# Patient Record
Sex: MALE | Employment: OTHER | URBAN - METROPOLITAN AREA
[De-identification: names, ages, dates, MRNs, and addresses within clinical notes are randomized per-mention and may not be internally consistent; named-entity substitution may affect disease eponyms.]

---

## 2020-09-24 ENCOUNTER — APPOINTMENT (OUTPATIENT)
Dept: RADIOLOGY | Facility: IMAGING CENTER | Age: 50
End: 2020-09-24
Attending: NURSE PRACTITIONER
Payer: COMMERCIAL

## 2020-09-24 ENCOUNTER — OFFICE VISIT (OUTPATIENT)
Dept: URGENT CARE | Facility: CLINIC | Age: 50
End: 2020-09-24
Payer: COMMERCIAL

## 2020-09-24 VITALS
HEIGHT: 69 IN | RESPIRATION RATE: 16 BRPM | SYSTOLIC BLOOD PRESSURE: 122 MMHG | HEART RATE: 66 BPM | BODY MASS INDEX: 26.07 KG/M2 | TEMPERATURE: 98 F | OXYGEN SATURATION: 96 % | DIASTOLIC BLOOD PRESSURE: 84 MMHG | WEIGHT: 176 LBS

## 2020-09-24 DIAGNOSIS — M84.475A METATARSAL FRACTURE, PATHOLOGIC, LEFT, INITIAL ENCOUNTER: ICD-10-CM

## 2020-09-24 DIAGNOSIS — S99.922A INJURY OF LEFT FOOT, INITIAL ENCOUNTER: ICD-10-CM

## 2020-09-24 PROCEDURE — 99203 OFFICE O/P NEW LOW 30 MIN: CPT | Performed by: NURSE PRACTITIONER

## 2020-09-24 PROCEDURE — 73630 X-RAY EXAM OF FOOT: CPT | Mod: TC,LT | Performed by: NURSE PRACTITIONER

## 2020-09-24 RX ORDER — FENOFIBRATE 48 MG/1
48 TABLET, COATED ORAL DAILY
COMMUNITY
End: 2021-03-16 | Stop reason: SDUPTHER

## 2020-09-24 ASSESSMENT — ENCOUNTER SYMPTOMS
DIZZINESS: 0
NAUSEA: 0
FALLS: 0
FOCAL WEAKNESS: 0
VOMITING: 0
TINGLING: 0
HEADACHES: 0
DIARRHEA: 0
CHILLS: 0
CONSTIPATION: 0
SENSORY CHANGE: 0
FEVER: 0
ABDOMINAL PAIN: 0

## 2020-09-24 NOTE — PROGRESS NOTES
Subjective:     Jw Ventura  is a 50 y.o. male who presents for Foot Injury (x last night, Lt. foot injury)       HPI   50-year-old male patient reports urgent care for new problem.  States he was walking down his outside steps holding onto the handrail and was walking down the steps sideways.  Stepped down on his left foot and sustained an eversion injury to the foot. Denies pain in the ankle.  Patient denies hearing a pop or click but has broken the same area of the left foot about 10 years ago which resulted in a spiral fracture.  Patient states he is had no pain or ongoing issues from previous injury.  Patient describes pain as a tightness and is able to ambulate but states that his pain is a 6 out of 10 with ambulation.  Has used ice, elevation and and ibuprofen for mild relief of symptoms.  Denies numbness or tingling denies weakness.  Review of Systems   Constitutional: Negative for chills, fever and malaise/fatigue.   Gastrointestinal: Negative for abdominal pain, constipation, diarrhea, nausea and vomiting.   Musculoskeletal: Positive for joint pain. Negative for falls.   Neurological: Negative for dizziness, tingling, sensory change, focal weakness and headaches.   All other systems reviewed and are negative.    History reviewed. No pertinent past medical history. History reviewed. No pertinent surgical history.   Social History     Socioeconomic History   • Marital status: Single     Spouse name: Not on file   • Number of children: Not on file   • Years of education: Not on file   • Highest education level: Not on file   Occupational History   • Not on file   Social Needs   • Financial resource strain: Not on file   • Food insecurity     Worry: Not on file     Inability: Not on file   • Transportation needs     Medical: Not on file     Non-medical: Not on file   Tobacco Use   • Smoking status: Former Smoker   • Smokeless tobacco: Never Used   Substance and Sexual Activity   • Alcohol use: Not on file    "  • Drug use: Not on file   • Sexual activity: Not on file   Lifestyle   • Physical activity     Days per week: Not on file     Minutes per session: Not on file   • Stress: Not on file   Relationships   • Social connections     Talks on phone: Not on file     Gets together: Not on file     Attends Alevism service: Not on file     Active member of club or organization: Not on file     Attends meetings of clubs or organizations: Not on file     Relationship status: Not on file   • Intimate partner violence     Fear of current or ex partner: Not on file     Emotionally abused: Not on file     Physically abused: Not on file     Forced sexual activity: Not on file   Other Topics Concern   • Not on file   Social History Narrative   • Not on file    Patient has no known allergies.     Objective:   /84 (BP Location: Left arm, Patient Position: Sitting, BP Cuff Size: Adult)   Pulse 66   Temp 36.7 °C (98 °F) (Temporal)   Resp 16   Ht 1.753 m (5' 9\")   Wt 79.8 kg (176 lb)   SpO2 96%   BMI 25.99 kg/m²   Physical Exam  Vitals signs reviewed.   Constitutional:       Appearance: Normal appearance.   Cardiovascular:      Rate and Rhythm: Normal rate and regular rhythm.      Pulses: Normal pulses.   Musculoskeletal:        Feet:    Skin:     General: Skin is warm.      Capillary Refill: Capillary refill takes less than 2 seconds.   Neurological:      Mental Status: He is alert and oriented to person, place, and time.   Psychiatric:         Mood and Affect: Mood normal.         Behavior: Behavior normal.         Thought Content: Thought content normal.         Judgment: Judgment normal.          Assessment/Plan:     1. Injury of left foot, initial encounter  - DX-FOOT-COMPLETE 3+ LEFT;   FINDINGS:  There is joint space narrowing and spurring of the first MTP joint. There is a fracture of the base of the fifth metatarsal. No dislocation is seen.        IMPRESSION:     Nondisplaced fracture of the base of the fifth " metatarsal.     Degenerative changes of the first MTP joint.  All images read and interpreted by radiology - discussed with patient     - REFERRAL TO SPORTS MEDICINE    2. Metatarsal fracture, pathologic, left, initial encounter  - REFERRAL TO SPORTS MEDICINE    Discussed physical examination findings as well as mechanism of injury and evidence of a nondisplaced fracture at the base of the fifth metatarsal of the left foot will need further evaluation by a nonsurgical sports medicine doctor.  Will provide patient with a referral.  Will also provide patient with a 4 inch Ace for support as well as a walking boot.  Discussed supportive care including rest, ice, elevation and over-the-counter NSAIDs and Tylenol per 's instructions.    Supportive care, differential diagnoses, and indications for immediate follow-up discussed with patient.    Pathogenesis of diagnosis discussed including typical length and natural progression. Patient expresses understanding and agrees to plan.    Instructed patient to return to clinic for worsening symptoms or symptoms that persist for 7 to 10 days     Please note that this dictation was created using voice recognition software. I have made every reasonable attempt to correct obvious errors, but I expect that there are errors of grammar and possibly content that I did not discover before finalizing the note.    Note electronically signed by KASSIE Glez

## 2020-10-02 ENCOUNTER — OFFICE VISIT (OUTPATIENT)
Dept: MEDICAL GROUP | Facility: CLINIC | Age: 50
End: 2020-10-02
Payer: COMMERCIAL

## 2020-10-02 VITALS
DIASTOLIC BLOOD PRESSURE: 78 MMHG | RESPIRATION RATE: 16 BRPM | HEIGHT: 69 IN | WEIGHT: 176 LBS | HEART RATE: 78 BPM | TEMPERATURE: 97.9 F | BODY MASS INDEX: 26.07 KG/M2 | OXYGEN SATURATION: 97 % | SYSTOLIC BLOOD PRESSURE: 118 MMHG

## 2020-10-02 DIAGNOSIS — S92.355A NONDISPLACED FRACTURE OF FIFTH METATARSAL BONE, LEFT FOOT, INITIAL ENCOUNTER FOR CLOSED FRACTURE: ICD-10-CM

## 2020-10-02 PROCEDURE — 28470 CLTX METATARSAL FX WO MNP EA: CPT | Mod: LT | Performed by: FAMILY MEDICINE

## 2020-10-02 ASSESSMENT — ENCOUNTER SYMPTOMS
FEVER: 0
VOMITING: 0
SHORTNESS OF BREATH: 0
DIZZINESS: 0
NAUSEA: 0
CHILLS: 0

## 2020-10-02 NOTE — PROGRESS NOTES
"Subjective:      Jw Ventura is a 50 y.o. male who presents with Foot Injury (Referral from / L foot injury )     Referred by KASSIE Glez for evaluation of LEFT foot pain    HPI   LEFT foot pain  Date of injury, September 24, 2020  Walking down outside steps at home sideways  Sustained an inversion injury to the foot   No pop at the time of injury  At the 5th metatarsal base  Worse with wt bearing  Improved with rest  POSITIVE prior injury in the same region  Not taking meds for pain at this point  No night sxs    Works as a   Likes going to gym, boxing, walking, cycling    Review of Systems   Constitutional: Negative for chills and fever.   Respiratory: Negative for shortness of breath.    Cardiovascular: Negative for chest pain.   Gastrointestinal: Negative for nausea and vomiting.   Neurological: Negative for dizziness.     PMH:  has a past medical history of Hyperlipemia.  MEDS:   Current Outpatient Medications:   •  fenofibrate (TRICOR) 48 MG Tab, Take 48 mg by mouth every day., Disp: , Rfl:   ALLERGIES: No Known Allergies  SURGHX:   Past Surgical History:   Procedure Laterality Date   • HIP ORIF      acetabular fx     SOCHX:  reports that he has quit smoking. He has never used smokeless tobacco.  FH: Family history was reviewed, no pertinent findings to report     Objective:     /78 (BP Location: Right arm, Patient Position: Sitting, BP Cuff Size: Adult)   Pulse 78   Temp 36.6 °C (97.9 °F) (Temporal)   Resp 16   Ht 1.753 m (5' 9\")   Wt 79.8 kg (176 lb)   SpO2 97%   BMI 25.99 kg/m²      Physical Exam     RIGHT ANKLE:  There is NO swelling noted at the ankle  Range of motion intact with dorsiflexion and plantarflexion, inversion and eversion  There is NO tenderness of the ATFL, CF or PTF ligament  There is NO tenderness of the lateral malleolus or medial malleolus  Anterior drawer testing is NEGATIVE  Talar tilt testing is NEGATIVE  The foot and ankle is otherwise " "neurovascularly intact    RIGHT FOOT:  There is NO swelling noted at the foot  There is NO tenderness at the base of the fifth metatarsal, cuboid, or tarsal navicular  There is NO pain with metatarsal squeeze test    LEFT ANKLE:  There is NO swelling noted at the ankle  Range of motion intact with dorsiflexion and plantarflexion, inversion and eversion  There is NO tenderness of the ATFL, CF or PTF ligament  There is NO tenderness of the lateral malleolus or medial malleolus  Anterior drawer testing is NEGATIVE  Talar tilt testing is NEGATIVE  The foot and ankle is otherwise neurovascularly intact    LEFT FOOT:  There is POSITIVE swelling noted at the foot with some ecchymosis  There is POSITIVE tenderness at the base of the fifth metatarsal, without tenderness of the cuboid, or tarsal navicular  There is MILD pain with metatarsal squeeze test    NEUTRAL stance  Able to ambulate with ANTALGIC gait     Assessment/Plan:        1. Nondisplaced fracture of fifth metatarsal bone, left foot, initial encounter for closed fracture       Date of injury, September 24, 2020  Walking down outside steps at home sideways  Sustained an inversion injury to the foot     He was properly fitted with a cam walker boot at his urgent care visit  He wore it for a few days and has since not been wearing his boot  We discussed the importance of compliance with wearing his cam walker boot since he has a higher risk of nonunion/further injury due to:  1.  Prior history of LEFT hip fracture with residual \"nerve damage\" resulting in numbness of the LEFT lower extremity  2.  Location of the fracture is in zone 2    Patient was reluctant to wear his boot because he has difficulty putting it on and off secondary to his LEFT hip stiffness from prior fracture  We discussed the risks associated with noncompliance including breaking through resulting in the need for surgical reduction    We also offered patient crutches study which not have to " weight-bear or can partially weight-bear in order to increase his likelihood of fracture union/healing  Patient also declined crutches    Return in about 2 weeks (around 10/16/2020).  For fracture check          9/24/2020 11:13 AM     HISTORY/REASON FOR EXAM:  Pain/Deformity Following Trauma        TECHNIQUE/EXAM DESCRIPTION AND NUMBER OF VIEWS:  3 nonweightbearing views of the LEFT foot.     COMPARISON:  None     FINDINGS:  There is joint space narrowing and spurring of the first MTP joint. There is a fracture of the base of the fifth metatarsal. No dislocation is seen.        IMPRESSION:     Nondisplaced fracture of the base of the fifth metatarsal.     Degenerative changes of the first MTP joint.    Interpreted in the office today with the patient    Thank you KASSIE Glez for allowing me to participate in caring for your patient.

## 2020-10-16 ENCOUNTER — APPOINTMENT (OUTPATIENT)
Dept: RADIOLOGY | Facility: IMAGING CENTER | Age: 50
End: 2020-10-16
Attending: FAMILY MEDICINE
Payer: COMMERCIAL

## 2020-10-16 ENCOUNTER — OFFICE VISIT (OUTPATIENT)
Dept: MEDICAL GROUP | Facility: CLINIC | Age: 50
End: 2020-10-16
Payer: COMMERCIAL

## 2020-10-16 VITALS
HEART RATE: 82 BPM | TEMPERATURE: 98.5 F | BODY MASS INDEX: 26.07 KG/M2 | WEIGHT: 176 LBS | DIASTOLIC BLOOD PRESSURE: 78 MMHG | SYSTOLIC BLOOD PRESSURE: 122 MMHG | OXYGEN SATURATION: 96 % | RESPIRATION RATE: 18 BRPM | HEIGHT: 69 IN

## 2020-10-16 DIAGNOSIS — S92.355A NONDISPLACED FRACTURE OF FIFTH METATARSAL BONE, LEFT FOOT, INITIAL ENCOUNTER FOR CLOSED FRACTURE: ICD-10-CM

## 2020-10-16 PROCEDURE — 99024 POSTOP FOLLOW-UP VISIT: CPT | Performed by: FAMILY MEDICINE

## 2020-10-16 PROCEDURE — 73630 X-RAY EXAM OF FOOT: CPT | Mod: TC,LT | Performed by: FAMILY MEDICINE

## 2020-10-16 NOTE — PROGRESS NOTES
"Subjective:      Jw Ventura is a 50 y.o. male who presents with Foot Injury (Referral from / L foot injury )     Referred by KASSIE Glez for evaluation of LEFT foot pain    HPI   LEFT foot pain  Date of injury, September 24, 2020  Walking down outside steps at home sideways  Sustained an inversion injury to the foot   No pop at the time of injury  At the 5th metatarsal base  NO pain in boot    Works as a   Likes going to gym, boxing, walking, cycling     Objective:     /78 (BP Location: Left arm, Patient Position: Sitting, BP Cuff Size: Adult)   Pulse 82   Temp 36.9 °C (98.5 °F) (Temporal)   Resp 18   Ht 1.753 m (5' 9\")   Wt 79.8 kg (176 lb)   SpO2 96%   BMI 25.99 kg/m²       Physical Exam    LEFT FOOT:  There is POSITIVE swelling noted at the foot with some ecchymosis  There is MINIMAL tenderness at the base of the fifth metatarsal, without tenderness of the cuboid, or tarsal navicular    NEUTRAL stance  Able to ambulate with NORMAL gait in cam walker     Assessment/Plan:        1. Nondisplaced fracture of fifth metatarsal bone, left foot, initial encounter for closed fracture       Date of injury, September 24, 2020  Walking down outside steps at home sideways  Sustained an inversion injury to the foot     1.  Prior history of LEFT hip fracture with residual \"nerve damage\" resulting in numbness of the LEFT lower extremity  2.  Location of the fracture is in zone 2    Continue cam walker    Discontinue boot on November 6, 2020 (he will have been immobilized for 6 weeks at that point)    Then, he can follow-up the week after, on or around November 13, 2020  Hopefully, we can clear him for regular activity at that point      10/16/2020 11:37 AM     HISTORY/REASON FOR EXAM:  Pain/Deformity Following Trauma; Verify fracture stability  Follow-up left base of 5th metatarsal fracture     TECHNIQUE/EXAM DESCRIPTION AND NUMBER OF VIEWS:  3 views of the LEFT foot.     COMPARISON:  " Left foot x-ray 9/24/2020     FINDINGS:     There is a fracture of the base of 5th metatarsal. Alignment is unchanged. No bony healing is identified.     There is probably an old 5th metatarsal diaphyseal fracture.     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     IMPRESSION:     No significant change in base of 5th metatarsal fracture                  9/24/2020 11:13 AM     HISTORY/REASON FOR EXAM:  Pain/Deformity Following Trauma        TECHNIQUE/EXAM DESCRIPTION AND NUMBER OF VIEWS:  3 nonweightbearing views of the LEFT foot.     COMPARISON:  None     FINDINGS:  There is joint space narrowing and spurring of the first MTP joint. There is a fracture of the base of the fifth metatarsal. No dislocation is seen.        IMPRESSION:     Nondisplaced fracture of the base of the fifth metatarsal.     Degenerative changes of the first MTP joint.    Thank you KASSIE Glez for allowing me to participate in caring for your patient.

## 2020-11-12 ENCOUNTER — OFFICE VISIT (OUTPATIENT)
Dept: MEDICAL GROUP | Facility: CLINIC | Age: 50
End: 2020-11-12
Payer: COMMERCIAL

## 2020-11-12 VITALS
TEMPERATURE: 98.2 F | SYSTOLIC BLOOD PRESSURE: 118 MMHG | RESPIRATION RATE: 16 BRPM | OXYGEN SATURATION: 97 % | HEIGHT: 69 IN | BODY MASS INDEX: 26.07 KG/M2 | DIASTOLIC BLOOD PRESSURE: 76 MMHG | WEIGHT: 176 LBS | HEART RATE: 78 BPM

## 2020-11-12 DIAGNOSIS — S92.355D NONDISPLACED FRACTURE OF FIFTH METATARSAL BONE, LEFT FOOT, SUBSEQUENT ENCOUNTER FOR FRACTURE WITH ROUTINE HEALING: ICD-10-CM

## 2020-11-12 PROCEDURE — 99024 POSTOP FOLLOW-UP VISIT: CPT | Performed by: FAMILY MEDICINE

## 2020-11-12 NOTE — PROGRESS NOTES
"Subjective:      Jw Ventura is a 50 y.o. male who presents with Foot Injury (Referral from [facility]/ L foot injury )     Referred by KASSIE Glez for evaluation of LEFT foot pain    HPI   LEFT foot pain  Date of injury, September 24, 2020  Walking down outside steps at home sideways  Sustained an inversion injury to the foot   No pop at the time of injury  At the 5th metatarsal base  NO pain in boot    Works as a   Likes going to gym, boxing, walking, cycling     Objective:     /76 (BP Location: Left arm, Patient Position: Sitting, BP Cuff Size: Adult)   Pulse 78   Temp 36.8 °C (98.2 °F) (Temporal)   Resp 16   Ht 1.753 m (5' 9\")   Wt 79.8 kg (176 lb)   SpO2 97%   BMI 25.99 kg/m²     Physical Exam    LEFT FOOT:  There is POSITIVE swelling noted at the foot with some ecchymosis  There is MINIMAL tenderness at the base of the fifth metatarsal, without tenderness of the cuboid, or tarsal navicular    NEUTRAL stance  Able to ambulate with NORMAL gait in cam walker     Assessment/Plan:       1. Nondisplaced fracture of fifth metatarsal bone, left foot, subsequent encounter for fracture with routine healing       Date of injury, September 24, 2020  Walking down outside steps at home sideways  Sustained an inversion injury to the foot     1.  Prior history of LEFT hip fracture with residual \"nerve damage\" resulting in numbness of the LEFT lower extremity  2.  Location of the fracture is in zone 2    He is now out of CAM Walker and tolerating full weightbearing without pain    Return to activity as tolerated, offered ankle brace, but patient politely declined due to his difficulty with bending to put on brace due to his prior hip issue    Follow-up as needed  He was provided with home exercise program for foot/ankle strengthening and we encouraged proprioceptive training    10/16/2020 11:37 AM     HISTORY/REASON FOR EXAM:  Pain/Deformity Following Trauma; Verify fracture " stability  Follow-up left base of 5th metatarsal fracture     TECHNIQUE/EXAM DESCRIPTION AND NUMBER OF VIEWS:  3 views of the LEFT foot.     COMPARISON:  Left foot x-ray 9/24/2020     FINDINGS:     There is a fracture of the base of 5th metatarsal. Alignment is unchanged. No bony healing is identified.     There is probably an old 5th metatarsal diaphyseal fracture.     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     IMPRESSION:     No significant change in base of 5th metatarsal fracture                  9/24/2020 11:13 AM     HISTORY/REASON FOR EXAM:  Pain/Deformity Following Trauma        TECHNIQUE/EXAM DESCRIPTION AND NUMBER OF VIEWS:  3 nonweightbearing views of the LEFT foot.     COMPARISON:  None     FINDINGS:  There is joint space narrowing and spurring of the first MTP joint. There is a fracture of the base of the fifth metatarsal. No dislocation is seen.        IMPRESSION:     Nondisplaced fracture of the base of the fifth metatarsal.     Degenerative changes of the first MTP joint.    Thank you KASSIE Glez for allowing me to participate in caring for your patient.

## 2021-01-19 ENCOUNTER — TELEPHONE (OUTPATIENT)
Dept: SCHEDULING | Facility: IMAGING CENTER | Age: 51
End: 2021-01-19

## 2021-03-16 ENCOUNTER — OFFICE VISIT (OUTPATIENT)
Dept: MEDICAL GROUP | Facility: MEDICAL CENTER | Age: 51
End: 2021-03-16
Payer: COMMERCIAL

## 2021-03-16 VITALS
WEIGHT: 184 LBS | TEMPERATURE: 97.6 F | HEIGHT: 69 IN | SYSTOLIC BLOOD PRESSURE: 124 MMHG | OXYGEN SATURATION: 96 % | RESPIRATION RATE: 16 BRPM | BODY MASS INDEX: 27.25 KG/M2 | DIASTOLIC BLOOD PRESSURE: 78 MMHG | HEART RATE: 74 BPM

## 2021-03-16 DIAGNOSIS — M25.511 ACUTE PAIN OF RIGHT SHOULDER: ICD-10-CM

## 2021-03-16 DIAGNOSIS — E78.1 HYPERTRIGLYCERIDEMIA: ICD-10-CM

## 2021-03-16 DIAGNOSIS — Z97.8 PRESENCE OF ORTHOTIC DEVICE: ICD-10-CM

## 2021-03-16 DIAGNOSIS — E78.5 HYPERLIPIDEMIA, UNSPECIFIED HYPERLIPIDEMIA TYPE: ICD-10-CM

## 2021-03-16 DIAGNOSIS — L98.9 BACK SKIN LESION: ICD-10-CM

## 2021-03-16 PROCEDURE — 99213 OFFICE O/P EST LOW 20 MIN: CPT | Performed by: FAMILY MEDICINE

## 2021-03-16 RX ORDER — FENOFIBRATE 48 MG/1
48 TABLET, COATED ORAL DAILY
Qty: 90 TABLET | Refills: 3 | Status: SHIPPED | OUTPATIENT
Start: 2021-03-16 | End: 2021-03-31

## 2021-03-16 ASSESSMENT — PATIENT HEALTH QUESTIONNAIRE - PHQ9: CLINICAL INTERPRETATION OF PHQ2 SCORE: 0

## 2021-03-16 NOTE — PROGRESS NOTES
"  Subjective:     Jw Ivan is a 50 y.o. male presenting to establish care:    1.  Shoulder pain: Has been having right shoulder pain for the past month and a half.  Describes a sharp pain that is occasionally dull, is intermittent, does not radiate.  No associated symptoms.  Denies any redness, swelling, numbness, tingling, weakness.  He will usually feel the pain with certain motions.  He has been trying to do more push-ups, is not sure if it is helping.  Also taking ibuprofen intermittently.  No recent injuries.  He does report extensive skateboarding, has had taken multiple falls in the past.  He did have a left hip fracture back in 2012.    2.  Orthotics: Has orthotics in his feet bilaterally, needs a referral to podiatry for new orthotics    3.  Skin lesion: He reports multiple skin lesions on his back that he usually sees a dermatologist for.  Needs referral.    4.  High cholesterol: Has a history of high cholesterol, is on fenofibrate.  He believes that his triglyceride levels were elevated in the past.      Current Outpatient Medications:   •  fenofibrate (TRICOR) 48 MG Tab, Take 1 tablet by mouth every day., Disp: 90 tablet, Rfl: 3    Objective:     Vitals: /78   Pulse 74   Temp 36.4 °C (97.6 °F)   Resp 16   Ht 1.753 m (5' 9\")   Wt 83.5 kg (184 lb)   SpO2 96%   BMI 27.17 kg/m²   General: Alert  HEENT: Normocephalic.  Neck supple.  No thyromegaly or masses palpated. No cervical or supraclavicular lymphadenopathy.  Heart: Regular rate and rhythm.  S1 and S2 normal.  No murmurs appreciated.  Respiratory: Normal respiratory effort.  Clear to auscultation bilaterally.  Abdomen: Non-distended, soft  Extremities: No leg edema.  MSK: No edema, ecchymosis, or deformities in the upper extremities.  No tenderness to palpation, but points to pain near the AC joint, anterior aspect of the shoulder. Range of motion of the shoulder intact.  Strength with biceps, triceps, deltoid,  5/5 bilaterally.  " Tone normal, no muscle atrophy.  Sensation grossly intact.  Biceps reflexes 2+ symmetric.  Radial pulses 2+ symmetric.  Negative [place], [place]'s, [place]'s, empty can.     Assessment/Plan:     Jw was seen today for establish care.    Diagnoses and all orders for this visit:    Acute pain of right shoulder  Self-limited issue, we discussed trying physical therapy first.  If no improvement, he will let us know.  Will refer to sports medicine next.  -     REFERRAL TO PHYSICAL THERAPY    Presence of orthotic device  -     REFERRAL TO PODIATRY    Back skin lesion  -     REFERRAL TO DERMATOLOGY    Hyperlipidemia, unspecified hyperlipidemia type  Hypertriglyceridemia  Chronic, stable, will check labs.  Continue fenofibrate.  Follow-up in a year  -     Comp Metabolic Panel; Future  -     Lipid Profile; Future  -     fenofibrate (TRICOR) 48 MG Tab; Take 1 tablet by mouth every day.        Return in about 1 year (around 3/16/2022) for routine follow up.

## 2021-03-30 ENCOUNTER — HOSPITAL ENCOUNTER (OUTPATIENT)
Dept: LAB | Facility: MEDICAL CENTER | Age: 51
End: 2021-03-30
Attending: FAMILY MEDICINE
Payer: COMMERCIAL

## 2021-03-30 DIAGNOSIS — E78.5 HYPERLIPIDEMIA, UNSPECIFIED HYPERLIPIDEMIA TYPE: ICD-10-CM

## 2021-03-30 LAB
ALBUMIN SERPL BCP-MCNC: 4.5 G/DL (ref 3.2–4.9)
ALBUMIN/GLOB SERPL: 1.7 G/DL
ALP SERPL-CCNC: 57 U/L (ref 30–99)
ALT SERPL-CCNC: 32 U/L (ref 2–50)
ANION GAP SERPL CALC-SCNC: 7 MMOL/L (ref 7–16)
AST SERPL-CCNC: 25 U/L (ref 12–45)
BILIRUB SERPL-MCNC: 0.5 MG/DL (ref 0.1–1.5)
BUN SERPL-MCNC: 13 MG/DL (ref 8–22)
CALCIUM SERPL-MCNC: 9.2 MG/DL (ref 8.5–10.5)
CHLORIDE SERPL-SCNC: 109 MMOL/L (ref 96–112)
CHOLEST SERPL-MCNC: 176 MG/DL (ref 100–199)
CO2 SERPL-SCNC: 28 MMOL/L (ref 20–33)
CREAT SERPL-MCNC: 0.88 MG/DL (ref 0.5–1.4)
GLOBULIN SER CALC-MCNC: 2.7 G/DL (ref 1.9–3.5)
GLUCOSE SERPL-MCNC: 96 MG/DL (ref 65–99)
HDLC SERPL-MCNC: 37 MG/DL
LDLC SERPL CALC-MCNC: 98 MG/DL
POTASSIUM SERPL-SCNC: 4.9 MMOL/L (ref 3.6–5.5)
PROT SERPL-MCNC: 7.2 G/DL (ref 6–8.2)
SODIUM SERPL-SCNC: 144 MMOL/L (ref 135–145)
TRIGL SERPL-MCNC: 205 MG/DL (ref 0–149)

## 2021-03-30 PROCEDURE — 36415 COLL VENOUS BLD VENIPUNCTURE: CPT

## 2021-03-30 PROCEDURE — 80061 LIPID PANEL: CPT

## 2021-03-30 PROCEDURE — 80053 COMPREHEN METABOLIC PANEL: CPT

## 2021-03-31 ENCOUNTER — IMMUNIZATION (OUTPATIENT)
Dept: FAMILY PLANNING/WOMEN'S HEALTH CLINIC | Facility: IMMUNIZATION CENTER | Age: 51
End: 2021-03-31
Payer: COMMERCIAL

## 2021-03-31 DIAGNOSIS — Z23 ENCOUNTER FOR VACCINATION: Primary | ICD-10-CM

## 2021-03-31 PROCEDURE — 91300 PFIZER SARS-COV-2 VACCINE: CPT | Performed by: INTERNAL MEDICINE

## 2021-03-31 PROCEDURE — 0001A PFIZER SARS-COV-2 VACCINE: CPT | Performed by: INTERNAL MEDICINE

## 2021-03-31 RX ORDER — FENOFIBRATE 145 MG/1
145 TABLET, COATED ORAL DAILY
Qty: 90 TABLET | Refills: 3 | Status: SHIPPED | OUTPATIENT
Start: 2021-03-31 | End: 2021-12-20 | Stop reason: SDUPTHER

## 2021-04-22 ENCOUNTER — PHYSICAL THERAPY (OUTPATIENT)
Dept: PHYSICAL THERAPY | Facility: REHABILITATION | Age: 51
End: 2021-04-22
Attending: FAMILY MEDICINE
Payer: COMMERCIAL

## 2021-04-22 DIAGNOSIS — M25.511 ACUTE PAIN OF RIGHT SHOULDER: ICD-10-CM

## 2021-04-22 PROCEDURE — 97161 PT EVAL LOW COMPLEX 20 MIN: CPT

## 2021-04-22 PROCEDURE — 97110 THERAPEUTIC EXERCISES: CPT

## 2021-04-22 SDOH — ECONOMIC STABILITY: GENERAL: QUALITY OF LIFE: GOOD

## 2021-04-22 ASSESSMENT — ENCOUNTER SYMPTOMS
EXACERBATED BY: SLEEPING
QUALITY: SHARP
PAIN TIMING: DURING SLEEP
PAIN SCALE: 0
ALLEVIATING FACTORS: POSITION CHANGE
PAIN TIMING: INTERMITTENT
EXACERBATED BY: OVERHEAD ACTIVITY
PAIN LOCATION: R SHOULDER
ALLEVIATING FACTORS: PAIN MEDICATION
PAIN SCALE AT LOWEST: 0
PAIN SCALE AT HIGHEST: 8
EXACERBATED BY: ACTIVITY
QUALITY: THROBBING

## 2021-04-22 NOTE — OP THERAPY EVALUATION
"  Outpatient Physical Therapy  INITIAL EVALUATION    [facility] [address].  [address]  [place] [address]  Phone:  [phone]  Fax:  [phone]    Date of Evaluation: 04/22/2021    Patient: Jw Ivan  YOB: 1970  MRN: [id number]     Referring Provider: Travis Rivera M.D.  [address] [facility]  [address]  [place] [address]   Referring Diagnosis Acute pain of right shoulder [M25.511]     Time Calculation    Start time: 1530  Stop time: 1620 Time Calculation (min): 50 minutes         Chief Complaint: Shoulder Injury and Shoulder Problem    Visit Diagnoses     ICD-10-CM   1. Acute pain of right shoulder  M25.511       No data found  Subjective:   History of Present Illness:     Date of onset:  2/1/2021    Mechanism of injury:  Per pt's visit with his PCP on 3/12/21, \"Shoulder pain: Has been having right shoulder pain for the past month and a half.  Describes a sharp pain that is occasionally dull, is intermittent, does not radiate.  No associated symptoms.  Denies any redness, swelling, numbness, tingling, weakness.  He will usually feel the pain with certain motions.  He has been trying to do more push-ups, is not sure if it is helping.  Also taking ibuprofen intermittently.  No recent injuries.  He does report extensive skateboarding, has had taken multiple falls in the past.  He did have a left hip fracture back in 2012.\"    The pt states that his shoulder has been bothering him since early February. He states that there wasn't a specific RADHA but that he could have irritated it sleeping. The pain is most prominent at night and occasionally even wakes him from sleep. Pt able to sleep in most positions but certain positions aggravate the pain. Pt previously went to the gym but has not with COVID. Pain with push ups, some pain reaching overhead, experiences crepitus in the R shoulder, pain with lateral raises, difficulty reaching behind back, states that he is " ergonomically conscious at his desk so no difficulty with working.     Quality of life:  Good  Prior level of function:  Pt previously independent with all functional tasks and IADLs requiring the R UE  Headaches:  no headaches  Pain:     Current pain ratin    At best pain ratin    At worst pain ratin    Location:  R shoulder    Quality:  Throbbing and sharp    Pain timing:  During sleep and intermittent    Relieving factors:  Pain medication and position change    Aggravating factors:  Activity, sleeping and overhead activity    Progression:  Stable  Social Support:     Lives in:  Multiple-level home    Lives with:  Spouse  Hand dominance:  Right  Treatments:     None    Patient Goals:     Patient goals for therapy:  Increased strength, decreased pain and independence with ADLs/IADLs    Other patient goals:  Avoid future intervention      Past Medical History:   Diagnosis Date   • Hyperlipemia      Past Surgical History:   Procedure Laterality Date   • OTHER ORTHOPEDIC SURGERY Left     hip surgery   • HIP ORIF      acetabular fx     Social History     Tobacco Use   • Smoking status: Former Smoker   • Smokeless tobacco: Never Used   Substance Use Topics   • Alcohol use: Not Currently     Comment: rarely     Family and Occupational History     Socioeconomic History   • Marital status:      Spouse name: Not on file   • Number of children: Not on file   • Years of education: Not on file   • Highest education level: Not on file   Occupational History   • Not on file       Objective     Postural Observations  Seated posture: good  Standing posture: good        Cervical Screen    Cervical range of motion within normal limits    Neurological Testing     Sensation     Shoulder   Left Shoulder   Intact: light touch    Right Shoulder   Intact: light touch    Myotome testing   Cervical (left)   All left cervical myotomes within normal limits    Cervical (right)   All right cervical myotomes within  normal limits    Palpation     Right   No palpable tenderness to the infraspinatus.   Hypertonic in the levator scapulae and upper trapezius.     Tenderness     Right Shoulder  Tenderness in the AC joint and biceps tendon (proximal). No tenderness in the infraspinatus tendon.     Active Range of Motion   Left Shoulder   Normal active range of motion    Right Shoulder   Flexion: 150 degrees   Abduction: 150 degrees   External rotation BTH: C6 (Stiff)   Internal rotation BTB: L3 (Pain)     Passive Range of Motion   Left Shoulder   Normal passive range of motion    Right Shoulder   Normal passive range of motion    Additional Passive Range of Motion Details  Mild pain at end range shoulder flexion PROM    Strength:      Left Shoulder   Normal muscle strength    Right Shoulder   Planes of Motion   Flexion: 5   Abduction: 4 (Pain)   External rotation at 0°: 4+   Internal rotation at 0°: 4+   Isolated Muscles   Biceps: 4+     Tests     Right Shoulder   Positive empty can, Neer's and painful arc.   Negative belly press, drop arm and Hawkin's.         Therapeutic Exercises (CPT 94049):     1. UBE Warm Up, L2, 3 minutes      Therapeutic Exercise Summary: Pt completed exercises below with therapist supervision to ensure proper performance. Pt was printed a copy of exercise program to perform at home.     Access Code: [email or web address]  URL: [email or web address]  Date: 04/22/2021  Prepared by: Hannah Shankar    Exercises  Shoulder External Rotation with Anchored Resistance - 1 x daily - 7 x weekly - 10 reps - 3 sets  Shoulder Internal Rotation with Resistance - 1 x daily - 7 x weekly - 10 reps - 3 sets  Standing Single Arm Eccentric Bicep Curl Pronated then Supinated - 1 x daily - 7 x weekly - 10 reps - 3 sets 6# weight  Standing Shoulder and Trunk Flexion at Table - 1 x daily - 7 x weekly - 10 reps - 3 sets  Sidelying Shoulder ER with Towel and Dumbbell - 1 x daily - 7 x weekly - 10 reps - 3 sets        Time-based  treatments/modalities:    Physical Therapy Timed Treatment Charges  Therapeutic exercise minutes (CPT 43625): 11 minutes      Assessment, Response and Plan:   Impairments: abnormal or restricted ROM, activity intolerance, lacks appropriate home exercise program, limited mobility and pain with function    Assessment details:  Jw Ivan is a 50 y.o. male who presents to skilled physical therapist initial evaluation with current complaints of insidious onset R shoulder pain that began in February 2021. He presents with objective impairments in R shoulder AROM, R shoulder strength and stability, positive impingement special tests, and decreased tolerance to desired functional tasks including sleeping and working out. The impairments found during PT evaluation can be addressed by PT intervention and he appears motivated to participate in therapeutic intervention in order to improve his current symptoms. It is anticipated that the pt will benefit from skilled PT intervention to address functional limitations and impairments detailed above and to maximize his return to PLOF.   Barriers to therapy:  None  Prognosis: good    Goals:   Short Term Goals:   1. Pt will demonstrate R shoulder flexion AROM to 160 degrees without pain  2. Pt will demonstrate R shoulder ER MMT to 5/5  3. Pt will be able to sleep for 4 hours without being awoken by pain  4. Pt will be able to perform 10 push ups with proper form and no increase in shoulder symptoms  Short term goal time span:  2-4 weeks      Long Term Goals:    1. Pt will be able to sleep through the night without an increase in shoulder symptoms  2. Pt will report at worst R shoulder pain to <3/10  3. Pt will be able to lift 10# overhead without an increase in pain  4. Pt will be independent with HEP  Long term goal time span:  6-8 weeks    Plan:   Therapy options:  Physical therapy treatment to continue  Planned therapy interventions:  E Stim Unattended (CPT 38938), Hot or Cold  Pack Therapy (CPT 24025), Manual Therapy (CPT 84037), Neuromuscular Re-education (CPT 89921) and Therapeutic Exercise (CPT 01239)  Frequency:  2x week  Duration in weeks:  6  Duration in visits:  12  Discussed with:  Patient  Plan details:  Pt educated on their current objective clinical presentation, anticipated PT POC, and importance of adherence to prescribed HEP in order to maximize PT benefit.        Functional Assessment Used  Quickdash General Total Score: 11.36     Referring provider co-signature:  I have reviewed this plan of care and my co-signature certifies the need for services.    Certification Period: 04/22/2021 to  06/10/21    Physician Signature: ________________________________ Date: ______________

## 2021-04-27 ENCOUNTER — PHYSICAL THERAPY (OUTPATIENT)
Dept: PHYSICAL THERAPY | Facility: REHABILITATION | Age: 51
End: 2021-04-27
Attending: FAMILY MEDICINE
Payer: COMMERCIAL

## 2021-04-27 DIAGNOSIS — M25.511 ACUTE PAIN OF RIGHT SHOULDER: ICD-10-CM

## 2021-04-27 PROCEDURE — 97110 THERAPEUTIC EXERCISES: CPT

## 2021-04-27 NOTE — OP THERAPY DAILY TREATMENT
"  Outpatient Physical Therapy  DAILY TREATMENT     [facility] Physical [address].  [address]  [place] [address]  Phone:  [phone]  Fax:  [phone]    Date: 04/27/2021    Patient: Jw Ivan  YOB: 1970  MRN: [id number]     Time Calculation    Start time: 1530  Stop time: 1600 Time Calculation (min): 30 minutes         Chief Complaint: Shoulder Problem    Visit #: 2    SUBJECTIVE:  The pt reports that he noticed mild improvement in his symptoms following initiation of exercise program last session. He reports that he was able to complete exercises over the weekend without adverse effects. He has no other new complaints or changes to report and is agreeable to PT.     OBJECTIVE:  Current objective measures: Pt demonstrates good activation of middle trapezius with prone 4-way shoulder          Therapeutic Exercises (CPT 27173):     1. UBE Warm Up, L5 4 minutes    2. Shoulder External Rotation with Anchored Resistance , 2x10 black t-band    3. Shoulder Internal Rotation with Resistance, 2x10 black band    4. Standing Single Arm Eccentric Bicep Curl Pronated then Supinated, 10x with 4 second eccentric, 10# dumbell    5. Standing t-band foam roll clocks, 10 rounds, green t-band    6. Shoulder flexion stretch, 3x30\"    7. Prone Houghstons 4 way, 10 reps each direction, 2# dumbell    8. T-band loop oscillation, 2x30\" orange t-band      Time-based treatments/modalities:    Physical Therapy Timed Treatment Charges  Therapeutic exercise minutes (CPT 95425): 28 minutes      ASSESSMENT:   Response to treatment: wJ tolerated a progressive increase in therex for shoulder strengthening and stabilization without onset of shoulder discomfort. He reported appropriate fatigue with today's session and expressed improved mobility and symptoms. He will continue to benefit from skilled PT intervention to maximize return to active PLOF.     PLAN/RECOMMENDATIONS:   Plan for treatment: therapy " treatment to continue next visit.  Planned interventions for next visit: continue with current treatment. Continue to progress pt's stabilization and mobility program.

## 2021-04-29 ENCOUNTER — PHYSICAL THERAPY (OUTPATIENT)
Dept: PHYSICAL THERAPY | Facility: REHABILITATION | Age: 51
End: 2021-04-29
Attending: FAMILY MEDICINE
Payer: COMMERCIAL

## 2021-04-29 DIAGNOSIS — M25.511 ACUTE PAIN OF RIGHT SHOULDER: ICD-10-CM

## 2021-04-29 PROCEDURE — 97110 THERAPEUTIC EXERCISES: CPT

## 2021-04-29 NOTE — OP THERAPY DAILY TREATMENT
"  Outpatient Physical Therapy  DAILY TREATMENT     [facility] Physical [address].  [address]  [place] [address]  Phone:  [phone]  Fax:  [phone]    Date: 04/29/2021    Patient: Jw Ivan  YOB: 1970  MRN: [id number]     Time Calculation    Start time: 1533  Stop time: 1600 Time Calculation (min): 27 minutes         Chief Complaint: Shoulder Problem and Shoulder Injury    Visit #: 3    SUBJECTIVE:  The pt states that he did not have any significant discomfort or fatigue after last session. He has no other new complaints or changes to report and is agreeable to PT.     OBJECTIVE:  Current objective measures: R shoulder flexion PROM to 170 degrees without pain          Therapeutic Exercises (CPT 49846):     1. UBE Warm Up, L5 2 minutes forward, 2 minutes backward    2. T-band UE D1 flexion/extension PNF, 15 reps ea    3. T-band UE D2 flexion/extension PNF, 10 reps each, Cueing for scapular retraction    4. 90/90 T-band ER, 10x black t-band    5. Plinth plank shoulder taps, 10x  each    6. Shoulder flexion stretch, 3x30\"    7. Prone Houghstons 4 way, 15 reps each direction, 3#    8. Modified side-plank, 2x30\" each      Therapeutic Exercise Summary: Pt educated to continue with currently prescribed HEP      Time-based treatments/modalities:    Physical Therapy Timed Treatment Charges  Therapeutic exercise minutes (CPT 95448): 25 minutes     ASSESSMENT:   Response to treatment: The pt was able to tolerate a progressive increase in therex for functional shoulder strengthening and stability with reports of fatigue, but no significant pain. He had the greatest difficulty with added t-band D2 flexion exercise, but was able to demonstrate decreased pain with cueing. He continues to demonstrate good adherence to HEP and will benefit from ongoing skilled PT to maximize his return to active PLOF.     PLAN/RECOMMENDATIONS:   Plan for treatment: therapy treatment to continue next " visit.  Planned interventions for next visit: continue with current treatment. Continue with progression of shoulder strengthening and stabilization program to pt tolerance.

## 2021-05-06 ENCOUNTER — APPOINTMENT (OUTPATIENT)
Dept: PHYSICAL THERAPY | Facility: REHABILITATION | Age: 51
End: 2021-05-06
Attending: FAMILY MEDICINE
Payer: COMMERCIAL

## 2021-05-11 ENCOUNTER — PHYSICAL THERAPY (OUTPATIENT)
Dept: PHYSICAL THERAPY | Facility: REHABILITATION | Age: 51
End: 2021-05-11
Attending: FAMILY MEDICINE
Payer: COMMERCIAL

## 2021-05-11 DIAGNOSIS — M25.511 ACUTE PAIN OF RIGHT SHOULDER: ICD-10-CM

## 2021-05-11 PROCEDURE — 97110 THERAPEUTIC EXERCISES: CPT

## 2021-05-11 NOTE — OP THERAPY DAILY TREATMENT
"  Outpatient Physical Therapy  DAILY TREATMENT     [facility] Physical [address].  [address]  [place] [address]  Phone:  [phone]  Fax:  [phone]    Date: 05/11/2021    Patient: Jw Ivan  YOB: 1970  MRN: [id number]     Time Calculation    Start time: 1530  Stop time: 1600 Time Calculation (min): 30 minutes         Chief Complaint: Shoulder Problem    Visit #: 4    SUBJECTIVE:  The pt states that his shoulder pain still comes and goes, but he does believe that overall the amount of pain he is experiencing is declining. He is confident in his ability to return to the gym but has not yet tried his gym program.     OBJECTIVE:  Current objective measures: Pt demonstrates improved scapular positioning during overhead stabilization exercises, less cueing for scapular depression          Therapeutic Exercises (CPT 76947):     1. UBE Warm Up, L5 2 minutes forward, 2 minutes backward    2. TRX rows, 15 reps     3. TRX Y's, 10 reps , Cueing for scapular depression    4. TRX T's, 10 reps    5. TRX push ups, 10 reps    6. TRX \"L\" step through, 10x each    7. TRX ecentric lats, 10 reps    8. Modified side-plank on elbow, 2x30\" each side    20. POC:  04/22/2021 to  06/10/21      Therapeutic Exercise Summary: Pt educated to continue with currently prescribed HEP      Time-based treatments/modalities:    Physical Therapy Timed Treatment Charges  Therapeutic exercise minutes (CPT 24872): 28 minutes      ASSESSMENT:   Response to treatment: The pt tolerated a progression in shoulder strengthening and stabilization exercises with reports of appropriate fatigue. He is making steady improvements in his tolerance to overhead activities and higher level shoulder strengthening. He expressed good self-efficacy with HEP and was encouraged to go to the gym at least one time before his next session in order to problem solve any issues that arise with return to gym. Anticipate transition to " independent HEP next session due to pt's current functional progress since starting physical therapy intervention. .     PLAN/RECOMMENDATIONS:   Plan for treatment: therapy treatment to continue next visit.  Planned interventions for next visit: continue with current treatment. Anticipate review of HEP next session with planned transition to independent exercise program after next visit.

## 2021-05-13 ENCOUNTER — APPOINTMENT (OUTPATIENT)
Dept: PHYSICAL THERAPY | Facility: REHABILITATION | Age: 51
End: 2021-05-13
Attending: FAMILY MEDICINE
Payer: COMMERCIAL

## 2021-05-18 ENCOUNTER — PHYSICAL THERAPY (OUTPATIENT)
Dept: PHYSICAL THERAPY | Facility: REHABILITATION | Age: 51
End: 2021-05-18
Attending: FAMILY MEDICINE
Payer: COMMERCIAL

## 2021-05-18 DIAGNOSIS — M25.511 ACUTE PAIN OF RIGHT SHOULDER: ICD-10-CM

## 2021-05-18 PROCEDURE — 97110 THERAPEUTIC EXERCISES: CPT

## 2021-05-18 NOTE — OP THERAPY DAILY TREATMENT
Outpatient Physical Therapy  DAILY TREATMENT     [facility] Physical [address].  [address]  [place] [address]  Phone:  [phone]  Fax:  [phone]    Date: 05/18/2021    Patient: Jw Ivan  YOB: 1970  MRN: [id number]     Time Calculation    Start time: 1533  Stop time: 1558 Time Calculation (min): 25 minutes         Chief Complaint: Shoulder Problem    Visit #: 5    SUBJECTIVE:  The pt states that he returned to his gym program last week and though he tried not to over-do it, he did experience onset of pain in his R shoulder following eccentric bicep curls. He states that he was challenged with UE PNF exercise at the gym, with more difficulty on the R compared to the L. He is confident with participation in HEP and ability to modify exercises as needed to manage his symptoms independently.     OBJECTIVE:          Therapeutic Exercises (CPT 18067):     1. UBE Warm Up, L5 2 minutes forward, 2 minutes backward    20. POC:  04/22/2021 to  06/10/21      Therapeutic Exercise Summary: Pt educated to continue with updated HEP and given copy of new exercise program as detailed below.     Access Code: [email or web address]  URL: [email or web address]  Date: 04/22/2021  Prepared by: Hannah Shankar    Exercises  Shoulder External Rotation with Anchored Resistance - 1 x daily - 7 x weekly - 10 reps - 3 sets  Shoulder Internal Rotation with Resistance - 1 x daily - 7 x weekly - 10 reps - 3 sets  Standing Single Arm Eccentric Bicep Curl Pronated then Supinated - 1 x daily - 7 x weekly - 10 reps - 3 sets  Standing Shoulder and Trunk Flexion at Table - 1 x daily - 7 x weekly - 10 reps - 3 sets  Sidelying Shoulder ER with Towel and Dumbbell - 1 x daily - 7 x weekly - 10 reps - 3 sets  Prone Single Arm Shoulder Y - 1 x daily - 7 x weekly - 10 reps - 3 sets  Prone Shoulder Horizontal Abduction - 1 x daily - 7 x weekly - 10 reps - 3 sets  Prone Shoulder Extension - Single Arm - 1 x daily - 7 x  weekly - 10 reps - 3 sets  Standing Single Arm Shoulder PNF D1 Extension with Anchored Resistance - 1 x daily - 7 x weekly - 10 reps - 3 sets  Standing Single Arm Shoulder PNF D1 Flexion with Anchored Resistance - 1 x daily - 7 x weekly - 10 reps - 3 sets  Shoulder PNF D2 with Resistance - 1 x daily - 7 x weekly - 10 reps - 3 sets  Standing Shoulder Single Arm PNF D2 Extension with Anchored Resistance - 1 x daily - 7 x weekly - 10 reps - 3 sets        Time-based treatments/modalities:    Physical Therapy Timed Treatment Charges  Therapeutic exercise minutes (CPT 59181): 24 minutes    ASSESSMENT:   Response to treatment: The pt has made overall improvements in his shoulder strength and stability since the start of care. He demonstrates good understanding and adherence to HEP to continue to improve his current shoulder strength and function. The pt is going to continue with participation in HEP and was educated to follow up with PT if further visits are needed.     PLAN/RECOMMENDATIONS:   Plan for treatment: therapy treatment to continue next visit.  Planned interventions for next visit: continue with current treatment. Pt is going to participate in independent HEP and follow up if further PT visits are needed.

## 2021-05-20 ENCOUNTER — APPOINTMENT (OUTPATIENT)
Dept: PHYSICAL THERAPY | Facility: REHABILITATION | Age: 51
End: 2021-05-20
Attending: FAMILY MEDICINE
Payer: COMMERCIAL

## 2021-06-03 ENCOUNTER — OFFICE VISIT (OUTPATIENT)
Dept: DERMATOLOGY | Facility: IMAGING CENTER | Age: 51
End: 2021-06-03
Payer: COMMERCIAL

## 2021-06-03 DIAGNOSIS — L02.92 CARBUNCLE AND FURUNCLE: ICD-10-CM

## 2021-06-03 DIAGNOSIS — L82.1 SEBORRHEIC KERATOSIS: ICD-10-CM

## 2021-06-03 DIAGNOSIS — D22.9 NEVUS: ICD-10-CM

## 2021-06-03 DIAGNOSIS — L85.8 KERATOSIS PILARIS: ICD-10-CM

## 2021-06-03 DIAGNOSIS — L02.93 CARBUNCLE AND FURUNCLE: ICD-10-CM

## 2021-06-03 DIAGNOSIS — L81.4 LENTIGO: ICD-10-CM

## 2021-06-03 PROCEDURE — 99203 OFFICE O/P NEW LOW 30 MIN: CPT | Performed by: DERMATOLOGY

## 2021-06-03 RX ORDER — CEPHALEXIN 500 MG/1
500 CAPSULE ORAL 3 TIMES DAILY
Qty: 30 CAPSULE | Refills: 0 | Status: SHIPPED | OUTPATIENT
Start: 2021-06-03 | End: 2022-03-17

## 2021-06-03 NOTE — PROGRESS NOTES
CC: skin lesion    Subjective: New patient here for back skin lesion.     HPI/location: back skin lesion poss irregular per pt,   Time present: doesn't know   Painful lesion: No  Itching lesion: No  Enlarging lesion: No   Present since 30s/40s.      Rough areas on backs of arms, picks at sites.    History of skin cancer: No  History of precancers/actinic keratoses: No  History of biopsies:Yes, Details: mole removed on Lt leg, benign,   History of blistering/severe sunburns:No  Family history of skin cancer:No  Family history of atypical moles:No     ROS: no fevers/chills. No itch.  No cough  DermPMH: no skin cancer/melanoma  No problem-specific Assessment & Plan notes found for this encounter.    Relevant PMH: NC  Social:former smoker    PE: Gen:WDWN male in NAD.  Skin: right flank - pustule with minimal surrounding erythema. approx 0.4cm  Tattoos widespread arms, back, legs.  Waxy/verrucous papule on upper back.  Scattered tan macules on shoulders/upper back, appearing benign.      A/P: VK/SK: upper back:  -b/r    Nevi: benign appearing:  -Reviewed skin cancer detection/prevention  -RTC PRN growth/changes/concerning features    Lentigos/SKs: benign  -reassurance  -reviewed skin cancer detection/prevention    KP by hx, arms:  -rec amlactin cream/moisturizer use    Pustule/carbuncle/furuncle:  -warm compresses, gentle drainage reviewed  -keflex 500mg PO TID X 7-10 d if worsens  -f/u for signs/sx of abscess reviewed      F/u PRN  I have reviewed medications relevant to my specialty.

## 2021-06-14 ENCOUNTER — TELEPHONE (OUTPATIENT)
Dept: PHYSICAL THERAPY | Facility: REHABILITATION | Age: 51
End: 2021-06-14

## 2021-06-14 NOTE — OP THERAPY DISCHARGE SUMMARY
Outpatient Physical Therapy  DISCHARGE SUMMARY NOTE      [facility] Physical Therapy [facility]  [address]  [address]  [place] [address]  Phone:  [phone]  Fax:  [phone]    Date of Visit: 06/14/2021    Patient: Jw Ivan  YOB: 1970  MRN: [id number]     Referring Provider: Travis Rivera M.D.  [address] [place] [address] [address]  [place] [address]   Referring Diagnosis Acute pain of right shoulder [M25.511]             Your patient is being discharged from Physical Therapy with the following comments:   · Goals met    Comments:  Jw Ivan was seen for 5 PT visits from 4/27/21-5/18/21 for R shoulder pain. During that time the pt made improvements in R shoulder pain, R shoulder strength and stability, R shoulder ROM, and self-efficacy with HEP. At last visit, the plan was for pt to continue with participation in independent gym and exercise program to continue to progress functional gains made during PT intervention. The pt will be D/C'd at this time due to participation in HEP and time since last PT visit.     Limitations Remaining:  At pt's last visit, it was discussed that pt would follow up to schedule more PT visits if needed. Pt has not called to schedule any further PT visits.    Recommendations:  Recommend that pt continue with prescribed exercise program that was developed during PT POC. If further pt services are needed, recommend that pt follow up with their PCP for a new PT referral.     Hannah Shankar, PT    Date: 6/14/2021

## 2021-07-30 DIAGNOSIS — Z12.11 SCREEN FOR COLON CANCER: ICD-10-CM

## 2021-11-26 ENCOUNTER — PHARMACY VISIT (OUTPATIENT)
Dept: PHARMACY | Facility: MEDICAL CENTER | Age: 51
End: 2021-11-26
Payer: COMMERCIAL

## 2021-11-26 PROCEDURE — RXMED WILLOW AMBULATORY MEDICATION CHARGE: Performed by: INTERNAL MEDICINE

## 2021-11-26 RX ORDER — RNA INGREDIENT BNT-162B2 0.23 G/1.8ML
0.3 INJECTION, SUSPENSION INTRAMUSCULAR
Qty: 0.3 ML | Refills: 0 | Status: SHIPPED | OUTPATIENT
Start: 2021-11-26 | End: 2022-03-17

## 2021-12-20 ENCOUNTER — PATIENT MESSAGE (OUTPATIENT)
Dept: MEDICAL GROUP | Facility: MEDICAL CENTER | Age: 51
End: 2021-12-20

## 2021-12-20 RX ORDER — FENOFIBRATE 145 MG/1
145 TABLET, COATED ORAL DAILY
Qty: 90 TABLET | Refills: 0 | Status: SHIPPED | OUTPATIENT
Start: 2021-12-20 | End: 2021-12-21

## 2021-12-20 NOTE — PATIENT COMMUNICATION
Received request via: Patient    Was the patient seen in the last year in this department? Yes    Does the patient have an active prescription (recently filled or refills available) for medication(s) requested? change of pharmacy     Requested Prescriptions     Pending Prescriptions Disp Refills   • fenofibrate (TRICOR) 145 MG Tab 90 Tablet 0     Sig: Take 1 Tablet by mouth every day.

## 2021-12-20 NOTE — TELEPHONE ENCOUNTER
From: Jw Ivan  To: Physician Travis Rivera  Sent: 12/20/2021 7:20 AM PST  Subject: Finofribte refill    Good morning Dr Rivera,    I hope you're doing well and staying healthy.     I have decided to change my pharmacy to [facility] (the [facility] location) and, as such, I will need my finofibrate prescriptioned called in to that pharmacy. I do have one refill left at my current pharmacy but am no longer wanting to use them. So, if you could please send in prescription for finofibrate to the [facility] pharmacy on [facility], i would appreciate it. Also, I would like to see about scheduling my annual physical. I believe my last visit with you was in March of 2021 and just want ot make sure I can get squeezed in your schedule for March of 2022.    Thank you    Jw

## 2021-12-21 PROCEDURE — RXMED WILLOW AMBULATORY MEDICATION CHARGE: Performed by: FAMILY MEDICINE

## 2021-12-21 RX ORDER — FENOFIBRATE 48 MG/1
48 TABLET, COATED ORAL DAILY
Qty: 90 TABLET | Refills: 3 | Status: SHIPPED | OUTPATIENT
Start: 2021-12-21 | End: 2021-12-23 | Stop reason: SDUPTHER

## 2021-12-23 ENCOUNTER — PHARMACY VISIT (OUTPATIENT)
Dept: PHARMACY | Facility: MEDICAL CENTER | Age: 51
End: 2021-12-23
Payer: COMMERCIAL

## 2021-12-23 RX ORDER — FENOFIBRATE 48 MG/1
48 TABLET, COATED ORAL DAILY
Qty: 90 TABLET | Refills: 3 | Status: SHIPPED | OUTPATIENT
Start: 2021-12-23 | End: 2022-04-09 | Stop reason: SDUPTHER

## 2022-01-18 ENCOUNTER — PATIENT MESSAGE (OUTPATIENT)
Dept: MEDICAL GROUP | Facility: MEDICAL CENTER | Age: 52
End: 2022-01-18

## 2022-01-18 DIAGNOSIS — E78.1 HYPERTRIGLYCERIDEMIA: ICD-10-CM

## 2022-02-23 ENCOUNTER — PATIENT MESSAGE (OUTPATIENT)
Dept: MEDICAL GROUP | Facility: MEDICAL CENTER | Age: 52
End: 2022-02-23
Payer: COMMERCIAL

## 2022-02-23 DIAGNOSIS — F32.A DEPRESSION, UNSPECIFIED DEPRESSION TYPE: ICD-10-CM

## 2022-03-15 ENCOUNTER — HOSPITAL ENCOUNTER (OUTPATIENT)
Dept: LAB | Facility: MEDICAL CENTER | Age: 52
End: 2022-03-15
Attending: FAMILY MEDICINE
Payer: COMMERCIAL

## 2022-03-15 DIAGNOSIS — E78.1 HYPERTRIGLYCERIDEMIA: ICD-10-CM

## 2022-03-15 LAB
ANION GAP SERPL CALC-SCNC: 11 MMOL/L (ref 7–16)
BUN SERPL-MCNC: 18 MG/DL (ref 8–22)
CALCIUM SERPL-MCNC: 9.7 MG/DL (ref 8.5–10.5)
CHLORIDE SERPL-SCNC: 102 MMOL/L (ref 96–112)
CHOLEST SERPL-MCNC: 166 MG/DL (ref 100–199)
CO2 SERPL-SCNC: 27 MMOL/L (ref 20–33)
CREAT SERPL-MCNC: 0.92 MG/DL (ref 0.5–1.4)
GFR SERPLBLD CREATININE-BSD FMLA CKD-EPI: 101 ML/MIN/1.73 M 2
GLUCOSE SERPL-MCNC: 82 MG/DL (ref 65–99)
HDLC SERPL-MCNC: 46 MG/DL
LDLC SERPL CALC-MCNC: 80 MG/DL
POTASSIUM SERPL-SCNC: 4.6 MMOL/L (ref 3.6–5.5)
SODIUM SERPL-SCNC: 140 MMOL/L (ref 135–145)
TRIGL SERPL-MCNC: 202 MG/DL (ref 0–149)

## 2022-03-15 PROCEDURE — 36415 COLL VENOUS BLD VENIPUNCTURE: CPT

## 2022-03-15 PROCEDURE — 80048 BASIC METABOLIC PNL TOTAL CA: CPT

## 2022-03-15 PROCEDURE — 80061 LIPID PANEL: CPT

## 2022-03-17 ENCOUNTER — OFFICE VISIT (OUTPATIENT)
Dept: MEDICAL GROUP | Facility: MEDICAL CENTER | Age: 52
End: 2022-03-17
Payer: COMMERCIAL

## 2022-03-17 VITALS
RESPIRATION RATE: 16 BRPM | DIASTOLIC BLOOD PRESSURE: 72 MMHG | SYSTOLIC BLOOD PRESSURE: 124 MMHG | HEART RATE: 74 BPM | OXYGEN SATURATION: 96 % | TEMPERATURE: 97.6 F | BODY MASS INDEX: 24.29 KG/M2 | WEIGHT: 164 LBS | HEIGHT: 69 IN

## 2022-03-17 DIAGNOSIS — M79.674 PAIN OF TOE OF RIGHT FOOT: ICD-10-CM

## 2022-03-17 DIAGNOSIS — Z97.8 PRESENCE OF ORTHOTIC DEVICE: ICD-10-CM

## 2022-03-17 DIAGNOSIS — Z91.89 OTHER SPECIFIED PERSONAL RISK FACTORS, NOT ELSEWHERE CLASSIFIED: ICD-10-CM

## 2022-03-17 DIAGNOSIS — L85.8 KERATOSIS PILARIS: ICD-10-CM

## 2022-03-17 DIAGNOSIS — E78.1 HYPERTRIGLYCERIDEMIA: ICD-10-CM

## 2022-03-17 PROCEDURE — 99213 OFFICE O/P EST LOW 20 MIN: CPT | Performed by: FAMILY MEDICINE

## 2022-03-17 ASSESSMENT — PATIENT HEALTH QUESTIONNAIRE - PHQ9: CLINICAL INTERPRETATION OF PHQ2 SCORE: 0

## 2022-03-17 NOTE — PROGRESS NOTES
"  Subjective:     Jw Ivan is a 51 y.o. male presenting for a follow-up.  Since the last visit, he has been exercising more regularly, going to the gym 5 days a week.  Has also cut down on his sugar and carb intake.  He has also gone vegan for the past 6 months.  He continues on the fenofibrate, is wondering if he can stop this.  He otherwise feels quite well.  He would like a referral back to dermatology for his keratosis pilaris.  He has not been moisturizing regularly.  He also needs a new referral to a podiatrist.        Current Outpatient Medications:   •  fenofibrate (TRICOR) 48 MG Tab, Take 1 Tablet by mouth every day., Disp: 90 Tablet, Rfl: 3    Objective:     Vitals: /72   Pulse 74   Temp 36.4 °C (97.6 °F)   Resp 16   Ht 1.753 m (5' 9\")   Wt 74.4 kg (164 lb)   SpO2 96%   BMI 24.22 kg/m²   General: Alert  HEENT: Normocephalic.   Heart: Regular rate and rhythm.  S1 and S2 normal.  No murmurs appreciated.  Respiratory: Normal respiratory effort.  Clear to auscultation bilaterally.  Abdomen: Non-distended, soft  Extremities: No leg edema.    Assessment/Plan:     Jw was seen today for annual exam.    Diagnoses and all orders for this visit:    Hypertriglyceridemia  Chronic, stable.  We reviewed his recent labs.  We can try stopping the fenofibrate and rechecking a lipid panel in 6 months.  We also discussed checking an optional coronary calcium score.  -     CT-CARDIAC SCORING; Future  -     Lipid Profile; Future    Other specified personal risk factors, not elsewhere classified  -     CT-CARDIAC SCORING; Future    Presence of orthotic device  Pain of toe of right foot  Chronic, stable, new referral placed  -     Referral to Podiatry    Keratosis pilaris  Chronic, stable.  We discussed exfoliating, moisturizing regularly.  -     Referral to Dermatology        Return in about 1 year (around 3/17/2023) for routine follow up.    "

## 2022-03-31 ENCOUNTER — PATIENT MESSAGE (OUTPATIENT)
Dept: MEDICAL GROUP | Facility: MEDICAL CENTER | Age: 52
End: 2022-03-31
Payer: COMMERCIAL

## 2022-03-31 DIAGNOSIS — E78.1 HYPERTRIGLYCERIDEMIA: ICD-10-CM

## 2022-03-31 DIAGNOSIS — Z87.81 HISTORY OF HIP FRACTURE: ICD-10-CM

## 2022-03-31 DIAGNOSIS — G89.29 CHRONIC LEFT-SIDED LOW BACK PAIN WITHOUT SCIATICA: ICD-10-CM

## 2022-03-31 DIAGNOSIS — M54.50 CHRONIC LEFT-SIDED LOW BACK PAIN WITHOUT SCIATICA: ICD-10-CM

## 2022-04-07 ENCOUNTER — HOSPITAL ENCOUNTER (OUTPATIENT)
Dept: RADIOLOGY | Facility: MEDICAL CENTER | Age: 52
End: 2022-04-07
Attending: FAMILY MEDICINE
Payer: COMMERCIAL

## 2022-04-07 DIAGNOSIS — E78.1 HYPERTRIGLYCERIDEMIA: ICD-10-CM

## 2022-04-07 DIAGNOSIS — Z91.89 OTHER SPECIFIED PERSONAL RISK FACTORS, NOT ELSEWHERE CLASSIFIED: ICD-10-CM

## 2022-04-07 PROCEDURE — 4410556 CT-CARDIAC SCORING (SELF PAY ONLY)

## 2022-04-08 ENCOUNTER — PATIENT MESSAGE (OUTPATIENT)
Dept: MEDICAL GROUP | Facility: MEDICAL CENTER | Age: 52
End: 2022-04-08
Payer: COMMERCIAL

## 2022-04-11 ENCOUNTER — PATIENT MESSAGE (OUTPATIENT)
Dept: MEDICAL GROUP | Facility: MEDICAL CENTER | Age: 52
End: 2022-04-11
Payer: COMMERCIAL

## 2022-04-11 PROCEDURE — RXMED WILLOW AMBULATORY MEDICATION CHARGE: Performed by: FAMILY MEDICINE

## 2022-04-11 RX ORDER — ROSUVASTATIN CALCIUM 10 MG/1
10 TABLET, COATED ORAL EVERY EVENING
Qty: 90 TABLET | Refills: 3 | Status: SHIPPED | OUTPATIENT
Start: 2022-04-11 | End: 2022-05-10

## 2022-04-11 RX ORDER — FENOFIBRATE 48 MG/1
48 TABLET, COATED ORAL DAILY
Qty: 90 TABLET | Refills: 3 | Status: SHIPPED | OUTPATIENT
Start: 2022-04-11 | End: 2023-05-03

## 2022-04-11 RX ORDER — ROSUVASTATIN CALCIUM 10 MG/1
10 TABLET, COATED ORAL EVERY EVENING
Qty: 90 TABLET | Refills: 3 | Status: SHIPPED | OUTPATIENT
Start: 2022-04-11 | End: 2022-04-11

## 2022-04-12 ENCOUNTER — PHARMACY VISIT (OUTPATIENT)
Dept: PHARMACY | Facility: MEDICAL CENTER | Age: 52
End: 2022-04-12
Payer: COMMERCIAL

## 2022-04-19 SDOH — ECONOMIC STABILITY: INCOME INSECURITY: IN THE LAST 12 MONTHS, WAS THERE A TIME WHEN YOU WERE NOT ABLE TO PAY THE MORTGAGE OR RENT ON TIME?: PATIENT REFUSED

## 2022-04-19 SDOH — ECONOMIC STABILITY: HOUSING INSECURITY
IN THE LAST 12 MONTHS, WAS THERE A TIME WHEN YOU DID NOT HAVE A STEADY PLACE TO SLEEP OR SLEPT IN A SHELTER (INCLUDING NOW)?: PATIENT REFUSED

## 2022-04-19 SDOH — HEALTH STABILITY: PHYSICAL HEALTH: ON AVERAGE, HOW MANY DAYS PER WEEK DO YOU ENGAGE IN MODERATE TO STRENUOUS EXERCISE (LIKE A BRISK WALK)?: 3 DAYS

## 2022-04-19 SDOH — ECONOMIC STABILITY: FOOD INSECURITY: WITHIN THE PAST 12 MONTHS, THE FOOD YOU BOUGHT JUST DIDN'T LAST AND YOU DIDN'T HAVE MONEY TO GET MORE.: PATIENT DECLINED

## 2022-04-19 SDOH — ECONOMIC STABILITY: TRANSPORTATION INSECURITY
IN THE PAST 12 MONTHS, HAS LACK OF RELIABLE TRANSPORTATION KEPT YOU FROM MEDICAL APPOINTMENTS, MEETINGS, WORK OR FROM GETTING THINGS NEEDED FOR DAILY LIVING?: PATIENT DECLINED

## 2022-04-19 SDOH — ECONOMIC STABILITY: FOOD INSECURITY: WITHIN THE PAST 12 MONTHS, YOU WORRIED THAT YOUR FOOD WOULD RUN OUT BEFORE YOU GOT MONEY TO BUY MORE.: PATIENT DECLINED

## 2022-04-19 SDOH — ECONOMIC STABILITY: INCOME INSECURITY: HOW HARD IS IT FOR YOU TO PAY FOR THE VERY BASICS LIKE FOOD, HOUSING, MEDICAL CARE, AND HEATING?: PATIENT DECLINED

## 2022-04-19 SDOH — HEALTH STABILITY: PHYSICAL HEALTH: ON AVERAGE, HOW MANY MINUTES DO YOU ENGAGE IN EXERCISE AT THIS LEVEL?: 60 MIN

## 2022-04-19 SDOH — ECONOMIC STABILITY: HOUSING INSECURITY

## 2022-04-19 SDOH — ECONOMIC STABILITY: TRANSPORTATION INSECURITY
IN THE PAST 12 MONTHS, HAS LACK OF TRANSPORTATION KEPT YOU FROM MEETINGS, WORK, OR FROM GETTING THINGS NEEDED FOR DAILY LIVING?: PATIENT DECLINED

## 2022-04-19 SDOH — ECONOMIC STABILITY: TRANSPORTATION INSECURITY
IN THE PAST 12 MONTHS, HAS THE LACK OF TRANSPORTATION KEPT YOU FROM MEDICAL APPOINTMENTS OR FROM GETTING MEDICATIONS?: PATIENT DECLINED

## 2022-04-19 SDOH — HEALTH STABILITY: MENTAL HEALTH
STRESS IS WHEN SOMEONE FEELS TENSE, NERVOUS, ANXIOUS, OR CAN'T SLEEP AT NIGHT BECAUSE THEIR MIND IS TROUBLED. HOW STRESSED ARE YOU?: NOT AT ALL

## 2022-04-19 ASSESSMENT — LIFESTYLE VARIABLES
HOW MANY STANDARD DRINKS CONTAINING ALCOHOL DO YOU HAVE ON A TYPICAL DAY: PATIENT DECLINED
HOW MANY STANDARD DRINKS CONTAINING ALCOHOL DO YOU HAVE ON A TYPICAL DAY: PATIENT DECLINED
HOW OFTEN DO YOU HAVE SIX OR MORE DRINKS ON ONE OCCASION: PATIENT DECLINED
HOW OFTEN DO YOU HAVE A DRINK CONTAINING ALCOHOL: PATIENT DECLINED
HOW OFTEN DO YOU HAVE A DRINK CONTAINING ALCOHOL: PATIENT DECLINED
HOW OFTEN DO YOU HAVE SIX OR MORE DRINKS ON ONE OCCASION: PATIENT DECLINED

## 2022-04-19 ASSESSMENT — SOCIAL DETERMINANTS OF HEALTH (SDOH)
HOW OFTEN DO YOU GET TOGETHER WITH FRIENDS OR RELATIVES?: PATIENT DECLINED
HOW OFTEN DO YOU ATTEND CHURCH OR RELIGIOUS SERVICES?: NEVER
HOW MANY DRINKS CONTAINING ALCOHOL DO YOU HAVE ON A TYPICAL DAY WHEN YOU ARE DRINKING: PATIENT DECLINED
HOW HARD IS IT FOR YOU TO PAY FOR THE VERY BASICS LIKE FOOD, HOUSING, MEDICAL CARE, AND HEATING?: PATIENT DECLINED
HOW OFTEN DO YOU ATTENT MEETINGS OF THE CLUB OR ORGANIZATION YOU BELONG TO?: PATIENT DECLINED
ARE YOU MARRIED, WIDOWED, DIVORCED, SEPARATED, NEVER MARRIED, OR LIVING WITH A PARTNER?: PATIENT DECLINED
ARE YOU MARRIED, WIDOWED, DIVORCED, SEPARATED, NEVER MARRIED, OR LIVING WITH A PARTNER?: PATIENT DECLINED
HOW OFTEN DO YOU ATTENT MEETINGS OF THE CLUB OR ORGANIZATION YOU BELONG TO?: PATIENT DECLINED
IN A TYPICAL WEEK, HOW MANY TIMES DO YOU TALK ON THE PHONE WITH FAMILY, FRIENDS, OR NEIGHBORS?: PATIENT DECLINED
IN A TYPICAL WEEK, HOW MANY TIMES DO YOU TALK ON THE PHONE WITH FAMILY, FRIENDS, OR NEIGHBORS?: PATIENT DECLINED
WITHIN THE PAST 12 MONTHS, YOU WORRIED THAT YOUR FOOD WOULD RUN OUT BEFORE YOU GOT THE MONEY TO BUY MORE: PATIENT DECLINED
DO YOU BELONG TO ANY CLUBS OR ORGANIZATIONS SUCH AS CHURCH GROUPS UNIONS, FRATERNAL OR ATHLETIC GROUPS, OR SCHOOL GROUPS?: PATIENT DECLINED
HOW OFTEN DO YOU ATTEND CHURCH OR RELIGIOUS SERVICES?: NEVER
DO YOU BELONG TO ANY CLUBS OR ORGANIZATIONS SUCH AS CHURCH GROUPS UNIONS, FRATERNAL OR ATHLETIC GROUPS, OR SCHOOL GROUPS?: PATIENT DECLINED
HOW OFTEN DO YOU HAVE SIX OR MORE DRINKS ON ONE OCCASION: PATIENT DECLINED
DO YOU BELONG TO ANY CLUBS OR ORGANIZATIONS SUCH AS CHURCH GROUPS UNIONS, FRATERNAL OR ATHLETIC GROUPS, OR SCHOOL GROUPS?: PATIENT DECLINED
HOW OFTEN DO YOU GET TOGETHER WITH FRIENDS OR RELATIVES?: PATIENT DECLINED
HOW OFTEN DO YOU GET TOGETHER WITH FRIENDS OR RELATIVES?: PATIENT DECLINED
ARE YOU MARRIED, WIDOWED, DIVORCED, SEPARATED, NEVER MARRIED, OR LIVING WITH A PARTNER?: PATIENT DECLINED
HOW OFTEN DO YOU ATTENT MEETINGS OF THE CLUB OR ORGANIZATION YOU BELONG TO?: PATIENT DECLINED
HOW OFTEN DO YOU ATTEND CHURCH OR RELIGIOUS SERVICES?: NEVER
HOW OFTEN DO YOU HAVE A DRINK CONTAINING ALCOHOL: PATIENT DECLINED
IN A TYPICAL WEEK, HOW MANY TIMES DO YOU TALK ON THE PHONE WITH FAMILY, FRIENDS, OR NEIGHBORS?: PATIENT DECLINED

## 2022-04-21 ENCOUNTER — APPOINTMENT (OUTPATIENT)
Dept: INTERNAL MEDICINE | Facility: OTHER | Age: 52
End: 2022-04-21
Payer: COMMERCIAL

## 2022-04-21 SDOH — HEALTH STABILITY: PHYSICAL HEALTH: ON AVERAGE, HOW MANY DAYS PER WEEK DO YOU ENGAGE IN MODERATE TO STRENUOUS EXERCISE (LIKE A BRISK WALK)?: 3 DAYS

## 2022-04-21 SDOH — ECONOMIC STABILITY: HOUSING INSECURITY

## 2022-04-21 SDOH — HEALTH STABILITY: PHYSICAL HEALTH: ON AVERAGE, HOW MANY MINUTES DO YOU ENGAGE IN EXERCISE AT THIS LEVEL?: 60 MIN

## 2022-04-21 ASSESSMENT — SOCIAL DETERMINANTS OF HEALTH (SDOH)
HOW OFTEN DO YOU GET TOGETHER WITH FRIENDS OR RELATIVES?: PATIENT DECLINED
HOW MANY DRINKS CONTAINING ALCOHOL DO YOU HAVE ON A TYPICAL DAY WHEN YOU ARE DRINKING: PATIENT DECLINED
HOW OFTEN DO YOU HAVE SIX OR MORE DRINKS ON ONE OCCASION: PATIENT DECLINED
HOW OFTEN DO YOU ATTENT MEETINGS OF THE CLUB OR ORGANIZATION YOU BELONG TO?: PATIENT DECLINED
HOW HARD IS IT FOR YOU TO PAY FOR THE VERY BASICS LIKE FOOD, HOUSING, MEDICAL CARE, AND HEATING?: PATIENT DECLINED
HOW OFTEN DO YOU HAVE A DRINK CONTAINING ALCOHOL: PATIENT DECLINED
WITHIN THE PAST 12 MONTHS, YOU WORRIED THAT YOUR FOOD WOULD RUN OUT BEFORE YOU GOT THE MONEY TO BUY MORE: PATIENT DECLINED
IN A TYPICAL WEEK, HOW MANY TIMES DO YOU TALK ON THE PHONE WITH FAMILY, FRIENDS, OR NEIGHBORS?: PATIENT DECLINED
ARE YOU MARRIED, WIDOWED, DIVORCED, SEPARATED, NEVER MARRIED, OR LIVING WITH A PARTNER?: PATIENT DECLINED
DO YOU BELONG TO ANY CLUBS OR ORGANIZATIONS SUCH AS CHURCH GROUPS UNIONS, FRATERNAL OR ATHLETIC GROUPS, OR SCHOOL GROUPS?: PATIENT DECLINED
HOW OFTEN DO YOU ATTEND CHURCH OR RELIGIOUS SERVICES?: NEVER

## 2022-05-09 ENCOUNTER — PATIENT MESSAGE (OUTPATIENT)
Dept: MEDICAL GROUP | Facility: MEDICAL CENTER | Age: 52
End: 2022-05-09
Payer: COMMERCIAL

## 2022-05-10 PROCEDURE — RXMED WILLOW AMBULATORY MEDICATION CHARGE: Performed by: FAMILY MEDICINE

## 2022-05-10 RX ORDER — ATORVASTATIN CALCIUM 20 MG/1
20 TABLET, FILM COATED ORAL NIGHTLY
Qty: 90 TABLET | Refills: 1 | Status: SHIPPED | OUTPATIENT
Start: 2022-05-10 | End: 2022-11-22 | Stop reason: SDUPTHER

## 2022-05-12 ENCOUNTER — NON-PROVIDER VISIT (OUTPATIENT)
Dept: INTERNAL MEDICINE | Facility: OTHER | Age: 52
End: 2022-05-12
Payer: COMMERCIAL

## 2022-05-12 VITALS — WEIGHT: 163 LBS | BODY MASS INDEX: 24.07 KG/M2

## 2022-05-12 DIAGNOSIS — E78.1 HYPERTRIGLYCERIDEMIA: ICD-10-CM

## 2022-05-12 PROCEDURE — 97802 MEDICAL NUTRITION INDIV IN: CPT | Performed by: DIETITIAN, REGISTERED

## 2022-05-12 NOTE — PROGRESS NOTES
Jw is a 51 y.o. male here for nutrition assessment for high triglycerides.  Put on medication sin 2016 for high TG - tried coming off of it and tried statin and did not tolerate it  Concern over his diet with recent change to a  Vegan diet   Taking Orgain - 25 grams of protein   Tries to eat rice/black beans   Wants to make sure he has the correct diet for his lifestyle  Currently exercising for health - cardio/weight training 3 days per week for  minutes and a lot of walking with 20 minutes on treadmill. When not at the gym, he tries to walk 5 miles per day   Uses his apple watch to track steps   Tries to limit his carb intake at the end of the day     24 hour recall:   B- coffee, Nature Fig Bar x4 (2 packages)  L - 2pm - large meal of the day  Varies - noodles, rice, tofu, vegetables,   S/grazes the rest of the day  - peanuts, chips, sunflower seeds  Orgain protein powder with 1 cup frozen fruit with coconut milk - before and after   Tries to stop eating around 6-7pm     Beverages: coffee, water, diet soda, no juice    Weight has been around 155-160 pounds and mostly stable for the last year  He is considering wanting to lose a little (less than 5 pounds)  Taking Centrum daily   Works from home   Sleep is ok     Eats out daily: asian foods, pizza    PES: Altered nutrition related lab values (TG) RT excessive carb intake AEB a current TG of 200    Jw has a history of high TG and they have come down but still above goal. He has a healthy weight although he'd prefer to lose a little which I support. His diet is quite high in carbs and low in protein on days he does not exercise. We reviewed some changes he can make to his diet to support his labs. Recommend he also re-start taking his Fish oil as he has gotten away from that. Set goals; rtc with RD in 1 month    Time spent: 60 minutes

## 2022-05-12 NOTE — PATIENT INSTRUCTIONS
Goals:  Consider adding in some protein with breakfast   Consider adding in extra veggies after your main meal/later in the day during snacking time   Consider re-starting Fish oil supplements (1-2g/day)   Check out the handouts for new recipe ideas for you

## 2022-05-16 ENCOUNTER — PHARMACY VISIT (OUTPATIENT)
Dept: PHARMACY | Facility: MEDICAL CENTER | Age: 52
End: 2022-05-16
Payer: COMMERCIAL

## 2022-06-17 ENCOUNTER — PHYSICAL THERAPY (OUTPATIENT)
Dept: PHYSICAL THERAPY | Facility: REHABILITATION | Age: 52
End: 2022-06-17
Attending: FAMILY MEDICINE
Payer: COMMERCIAL

## 2022-06-17 DIAGNOSIS — G89.29 CHRONIC LEFT-SIDED LOW BACK PAIN WITHOUT SCIATICA: ICD-10-CM

## 2022-06-17 DIAGNOSIS — Z87.81 HISTORY OF HIP FRACTURE: ICD-10-CM

## 2022-06-17 DIAGNOSIS — M54.50 CHRONIC LEFT-SIDED LOW BACK PAIN WITHOUT SCIATICA: ICD-10-CM

## 2022-06-17 PROCEDURE — 97162 PT EVAL MOD COMPLEX 30 MIN: CPT

## 2022-06-17 ASSESSMENT — ENCOUNTER SYMPTOMS
ALLEVIATING FACTORS: SITTING DOWN
PAIN SCALE AT HIGHEST: 3
QUALITY: DULL ACHE
PAIN SCALE AT LOWEST: 0
EXACERBATED BY: ACTIVITY
PAIN SCALE: 0

## 2022-06-17 NOTE — OP THERAPY EVALUATION
Outpatient Physical Therapy  INITIAL EVALUATION    [facility] [address].  [address]  [place] [address]  Phone:  [phone]  Fax:  [phone]    Date of Evaluation: 2022    Patient: Jw Ivan  YOB: 1970  MRN: [id number]     Referring Provider: Travis Rivera M.D.  [address] [place] [address] [address]  [place] [address]   Referring Diagnosis Chronic left-sided low back pain without sciatica [M54.50, G89.29];History of hip fracture [Z87.81]     Time Calculation  Start time: 1315  Stop time: 1400 Time Calculation (min): 45 minutes         Chief Complaint: Back Problem    Visit Diagnoses     ICD-10-CM   1. Chronic left-sided low back pain without sciatica  M54.50    G89.29   2. History of hip fracture  Z87.81       Date of onset of impairment: 2022    Subjective:   History of Present Illness:     Mechanism of injury:  Pt is a 50 yo male presenting to PT with c/o LBP. Reports pain starts on the L side above the hips. Hx of L hip fracture from skateboard accident that occurred in . Currently works out 3x/weekly in the gym, including lifting, walks an average of 5 miles throughout the day. Reports symptoms are more of an achy/soreness. Has increased fatigued with activity, which changes gait pattern. Hip extension feels like it decreases after activity. Has difficulty standing for long periods of time. Currently sits a lot for work, but will be taking on new roll that requires more walking and standing. Is worried about needing to stand for long periods of time.  Sleep disturbance:  Not disrupted  Pain:     Current pain ratin    At best pain ratin    At worst pain rating:  3 (Fatigue/soreness level)    Quality:  Dull ache (fatigue)    Relieving factors:  Sitting down    Aggravating factors:  Activity  Activities of Daily Living:     Patient reported ADL status: Difficulty putting on socks and shoes d/t decreased ROM  Patient Goals:      "Patient goals for therapy:  Increased strength, decreased pain and increased motion    Other patient goals:  Improve HEP      Past Medical History:   Diagnosis Date   • Hyperlipemia      Past Surgical History:   Procedure Laterality Date   • OTHER ORTHOPEDIC SURGERY Left 2012    hip surgery   • ORIF, HIP      acetabular fx     Social History     Tobacco Use   • Smoking status: Former Smoker     Packs/day: 1.00     Years: 25.00     Pack years: 25.00     Quit date: 1/1/2012     Years since quitting: 10.4   • Smokeless tobacco: Never Used   Substance Use Topics   • Alcohol use: Not Currently     Comment: rarely     Family and Occupational History     Socioeconomic History   • Marital status:      Spouse name: Not on file   • Number of children: Not on file   • Years of education: Not on file   • Highest education level: Not on file   Occupational History   • Not on file       Objective     Hip Screen   Hip range of motion within functional limits with the following exceptions: Global decreased L hip ROM    Active Range of Motion     Lumbar   Flexion: within functional limits (L side pain)  Extension: decreased  Left lateral flexion: decreased  Right lateral flexion: decreased  Left rotation: within functional limits  Right rotation: within functional limits    Strength:      Left Hip   Planes of Motion   Flexion: 5  Extension: 5  Abduction: 4+    Right Hip   Planes of Motion   Flexion: 5  Extension: 5  Abduction: 5    Additional Strength Details  Abdominals 30 degrees    Functional Assessment   Squat   Trunk lean right.     Single Leg Squat   Left Leg  Positive Trendelenburg and valgus.     Comments  Posterior bias to back leg during split squat - R LE forward        Therapeutic Exercises (CPT 18929):     1. Single leg bridge, x10    2. Hip flexor stretch, x30\"      Therapeutic Exercise Summary: HEP: 1x/daily  Single leg bridge, hip flexor stretch      Time-based treatments/modalities:           Assessment, " Response and Plan:   Impairments: activity intolerance, impaired physical strength and lacks appropriate home exercise program    Assessment details:  Pt is a 52 yo male presenting to PT w/ L side LBP likely secondary to hx of traumatic L hip fx in 2012. Pertinent clinical findings include global decrease in L hip mobility, as well as L side proximal hip weakness compared to the other side. Demo altered squat and single leg squat mechanics. Impairments are associated w/ difficulty participating in daily physcial activities, standing/walking for long periods of time. The patient would benefit from skilled PT to address strength/endurance and mobility deficits in order to return to PLOF and increase participation with daily activities. The pt has a good prognosis d/t age, overall good health, current level of activity, and high motivation. The patient states he understands and agrees with the plan of care. HEP w/ handout was provided to the pt.  Barriers to therapy:  None  Prognosis: good    Goals:   Short Term Goals:   1. Pt will be independent with HEP 3-5x per week for improving strength, ROM and decreasing pain.  2. Pt will demo proper squat mechanics w/ equal WB bilaterally in order to increase participation with physical activity for general health  Short term goal time span:  2-4 weeks      Long Term Goals:    1. Pt will demonstrate lumbar ROM WNL and without pain in order to perform ADLs   2. Pt will demo global hip strength 5/5 for improved stabilty.  3. Pt will report ability to tolerate standing for full work shift with fatigue/pain no greater than 1/10.  4. Pt will report ability to tolerate walking 5 miles w/o fatigue greater than 1/10 and without altered gait mechanics.  Long term goal time span:  4-6 weeks    Plan:   Therapy options:  Physical therapy treatment to continue  Planned therapy interventions:  E Stim Unattended (CPT 89307), Manual Therapy (CPT 68043), Neuromuscular Re-education (CPT 29734),  Therapeutic Activities (CPT 62977) and Therapeutic Exercise (CPT 15942)  Frequency:  1x week  Duration in visits:  8  Discussed with:  Patient      Functional Assessment Used  Michael Rigo Low Back Pain and Disability Score: 16.67     Referring provider co-signature:  I have reviewed this plan of care and my co-signature certifies the need for services.    Certification Period: 06/17/2022 to  07/29/22    Physician Signature: ________________________________ Date: ______________

## 2022-06-21 ENCOUNTER — PHYSICAL THERAPY (OUTPATIENT)
Dept: PHYSICAL THERAPY | Facility: REHABILITATION | Age: 52
End: 2022-06-21
Attending: FAMILY MEDICINE
Payer: COMMERCIAL

## 2022-06-21 DIAGNOSIS — M54.50 CHRONIC LEFT-SIDED LOW BACK PAIN WITHOUT SCIATICA: ICD-10-CM

## 2022-06-21 DIAGNOSIS — G89.29 CHRONIC LEFT-SIDED LOW BACK PAIN WITHOUT SCIATICA: ICD-10-CM

## 2022-06-21 PROCEDURE — 97110 THERAPEUTIC EXERCISES: CPT

## 2022-06-21 NOTE — OP THERAPY DAILY TREATMENT
"  Outpatient Physical Therapy  DAILY TREATMENT     [facility] [address].  [address]  [place] [address]  Phone:  [phone]  Fax:  [phone]    Date: 06/21/2022    Patient: Jw Ivan  YOB: 1970  MRN: [id number]     Time Calculation    Start time: 1447  Stop time: 1545 Time Calculation (min): 58 minutes         Chief Complaint: Hip Problem    Visit #: 2    SUBJECTIVE:  Pt reports L hip has been more sore. Had a lot of trouble completing hip flexor stretch d/t pain and discomfort. Soreness has made going to the gym difficult and gait is altered    OBJECTIVE:  Current objective measures:           Therapeutic Exercises (CPT 80189):     1. Bridges/Single leg bridge, x10 DL, 2x6 SL    2. Hip flexor stretch, Held    3. Self hip mob, 1/2 kneeling w/ IR x10, Added to HEP    4. LB/Lat stretch at true stretch, 2x30\" ea    5. SB prayer stretch , 2x30\", Added to HEP    6. SL TRX squat, 3x7    7. Lat step down 6\", 3x8, Added to HEP    8. SL stance w/ ball toss, x20, 6#      Therapeutic Exercise Summary: HEP: 1x/daily  Single leg bridge, self hip mob, lat step down, ball prayer stretch    Therapeutic Treatments and Modalities:     1. Hot or Cold Pack Therapy (CPT 84037), MHP to low maranda/L hip, No charge    Time-based treatments/modalities:    Physical Therapy Timed Treatment Charges  Therapeutic exercise minutes (CPT 55877): 43 minutes        ASSESSMENT:   Response to treatment: Progressed single leg strengthening and stability exercises. Pt demo L side proximal hip weakness compared to the other side. Difficulty maintaining balance during ball toss. HEP progressed to reflect new exercises, handout provided.    PLAN/RECOMMENDATIONS:   Plan for treatment: therapy treatment to continue next visit.  Planned interventions for next visit: continue with current treatment.       "

## 2022-06-24 ENCOUNTER — APPOINTMENT (OUTPATIENT)
Dept: PHYSICAL THERAPY | Facility: REHABILITATION | Age: 52
End: 2022-06-24
Attending: FAMILY MEDICINE
Payer: COMMERCIAL

## 2022-06-28 ENCOUNTER — APPOINTMENT (OUTPATIENT)
Dept: PHYSICAL THERAPY | Facility: REHABILITATION | Age: 52
End: 2022-06-28
Attending: FAMILY MEDICINE
Payer: COMMERCIAL

## 2022-07-01 ENCOUNTER — APPOINTMENT (OUTPATIENT)
Dept: PHYSICAL THERAPY | Facility: REHABILITATION | Age: 52
End: 2022-07-01
Attending: FAMILY MEDICINE
Payer: COMMERCIAL

## 2022-07-05 ENCOUNTER — APPOINTMENT (OUTPATIENT)
Dept: PHYSICAL THERAPY | Facility: REHABILITATION | Age: 52
End: 2022-07-05
Attending: FAMILY MEDICINE
Payer: COMMERCIAL

## 2022-07-08 ENCOUNTER — APPOINTMENT (OUTPATIENT)
Dept: PHYSICAL THERAPY | Facility: REHABILITATION | Age: 52
End: 2022-07-08
Attending: FAMILY MEDICINE
Payer: COMMERCIAL

## 2022-07-12 ENCOUNTER — APPOINTMENT (OUTPATIENT)
Dept: PHYSICAL THERAPY | Facility: REHABILITATION | Age: 52
End: 2022-07-12
Attending: FAMILY MEDICINE
Payer: COMMERCIAL

## 2022-07-15 ENCOUNTER — APPOINTMENT (OUTPATIENT)
Dept: PHYSICAL THERAPY | Facility: REHABILITATION | Age: 52
End: 2022-07-15
Attending: FAMILY MEDICINE
Payer: COMMERCIAL

## 2022-08-03 PROCEDURE — RXMED WILLOW AMBULATORY MEDICATION CHARGE: Performed by: FAMILY MEDICINE

## 2022-08-12 ENCOUNTER — PHARMACY VISIT (OUTPATIENT)
Dept: PHARMACY | Facility: MEDICAL CENTER | Age: 52
End: 2022-08-12
Payer: COMMERCIAL

## 2022-08-15 ENCOUNTER — PHARMACY VISIT (OUTPATIENT)
Dept: PHARMACY | Facility: MEDICAL CENTER | Age: 52
End: 2022-08-15
Payer: COMMERCIAL

## 2022-08-15 PROCEDURE — RXMED WILLOW AMBULATORY MEDICATION CHARGE: Performed by: INTERNAL MEDICINE

## 2022-08-15 RX ORDER — BNT162B2 0.23 MG/2.25ML
0.3 INJECTION, SUSPENSION INTRAMUSCULAR
Qty: 0.3 ML | Refills: 0 | Status: SHIPPED | OUTPATIENT
Start: 2022-08-15 | End: 2023-05-03

## 2022-11-18 ENCOUNTER — TELEPHONE (OUTPATIENT)
Dept: PHYSICAL THERAPY | Facility: REHABILITATION | Age: 52
End: 2022-11-18
Payer: COMMERCIAL

## 2022-11-18 NOTE — OP THERAPY DISCHARGE SUMMARY
Outpatient Physical Therapy  DISCHARGE SUMMARY NOTE      [facility] [address]  [address]  [place] [address]  Phone:  [phone]  Fax:  [phone]    Date of Visit: 11/18/2022    Patient: Jw Ivan  YOB: 1970  MRN: [id number]     Referring Provider: Travis Rivera M.D.  [address] [place] [address] [address]  [place] [address]   Referring Diagnosis Chronic left-sided low back pain without sciatica [M54.50, G89.29];History of hip fracture [Z87.81]         Functional Assessment Used        Your patient is being discharged from Physical Therapy with the following comments:   Patient cancelled or missed more than 2 scheduled appointments/non-compliant    Comments:  Pt has attended PT 2 visits from 6/17/22 to 6/21/22. Pt canceled remaining appts, failed to reschedule. Appropriate for DC, unable to assess goals.        Neha Chilel, PT, DPT    Date: 11/18/2022

## 2022-11-22 PROCEDURE — RXMED WILLOW AMBULATORY MEDICATION CHARGE: Performed by: FAMILY MEDICINE

## 2022-11-22 RX ORDER — ATORVASTATIN CALCIUM 20 MG/1
20 TABLET, FILM COATED ORAL NIGHTLY
Qty: 90 TABLET | Refills: 0 | Status: SHIPPED | OUTPATIENT
Start: 2022-11-22 | End: 2023-03-10 | Stop reason: SDUPTHER

## 2022-12-01 ENCOUNTER — PHARMACY VISIT (OUTPATIENT)
Dept: PHARMACY | Facility: MEDICAL CENTER | Age: 52
End: 2022-12-01
Payer: COMMERCIAL

## 2022-12-01 PROCEDURE — RXOTC WILLOW AMBULATORY OTC CHARGE

## 2022-12-13 ENCOUNTER — PHARMACY VISIT (OUTPATIENT)
Dept: PHARMACY | Facility: MEDICAL CENTER | Age: 52
End: 2022-12-13
Payer: COMMERCIAL

## 2022-12-13 PROCEDURE — RXMED WILLOW AMBULATORY MEDICATION CHARGE: Performed by: INTERNAL MEDICINE

## 2022-12-13 RX ORDER — INFLUENZA A VIRUS A/BRISBANE/02/2018 (H1N1) RECOMBINANT HEMAGGLUTININ ANTIGEN, INFLUENZA A VIRUS A/KANSAS/14/2017 (H3N2) RECOMBINANT HEMAGGLUTININ ANTIGEN, INFLUENZA B VIRUS B/PHUKET/3073/2013 RECOMBINANT HEMAGGLUTININ ANTIGEN, AND INFLUENZA B VIRUS B/MARYLAND/15/2016 RECOMBINANT HEMAGGLUTININ ANTIGEN 45; 45; 45; 45 UG/.5ML; UG/.5ML; UG/.5ML; UG/.5ML
INJECTION INTRAMUSCULAR
Qty: 0.5 ML | Refills: 0 | Status: SHIPPED | OUTPATIENT
Start: 2022-12-13 | End: 2023-05-03

## 2023-03-10 PROCEDURE — RXMED WILLOW AMBULATORY MEDICATION CHARGE: Performed by: FAMILY MEDICINE

## 2023-03-13 ENCOUNTER — PHARMACY VISIT (OUTPATIENT)
Dept: PHARMACY | Facility: MEDICAL CENTER | Age: 53
End: 2023-03-13
Payer: COMMERCIAL

## 2023-05-03 ENCOUNTER — OFFICE VISIT (OUTPATIENT)
Dept: MEDICAL GROUP | Facility: MEDICAL CENTER | Age: 53
End: 2023-05-03
Payer: COMMERCIAL

## 2023-05-03 VITALS
WEIGHT: 173 LBS | HEIGHT: 69 IN | SYSTOLIC BLOOD PRESSURE: 122 MMHG | RESPIRATION RATE: 16 BRPM | DIASTOLIC BLOOD PRESSURE: 72 MMHG | HEART RATE: 64 BPM | TEMPERATURE: 97.5 F | OXYGEN SATURATION: 98 % | BODY MASS INDEX: 25.62 KG/M2

## 2023-05-03 DIAGNOSIS — Z87.891 HISTORY OF TOBACCO ABUSE: ICD-10-CM

## 2023-05-03 DIAGNOSIS — Z11.59 NEED FOR HEPATITIS C SCREENING TEST: ICD-10-CM

## 2023-05-03 DIAGNOSIS — E78.1 HYPERTRIGLYCERIDEMIA: ICD-10-CM

## 2023-05-03 DIAGNOSIS — Z01.83 ENCOUNTER FOR BLOOD TYPING: ICD-10-CM

## 2023-05-03 PROCEDURE — 99213 OFFICE O/P EST LOW 20 MIN: CPT | Performed by: FAMILY MEDICINE

## 2023-05-03 ASSESSMENT — PATIENT HEALTH QUESTIONNAIRE - PHQ9: CLINICAL INTERPRETATION OF PHQ2 SCORE: 0

## 2023-05-03 NOTE — PROGRESS NOTES
"  Subjective:     Jw Ivan is a 52 y.o. male presenting for a follow up          Current Outpatient Medications:     atorvastatin (LIPITOR) 20 MG Tab, Take 1 tablet by mouth every evening., Disp: 90 Tablet, Rfl: 0    Objective:     Vitals: /72   Pulse 64   Temp 36.4 °C (97.5 °F)   Resp 16   Ht 1.753 m (5' 9\")   Wt 78.5 kg (173 lb)   SpO2 98%   BMI 25.55 kg/m²   General: Alert  HEENT: Normocephalic  Heart: Regular rate and rhythm.  S1 and S2 normal.  No murmurs appreciated.  Respiratory: Normal respiratory effort.  Clear to auscultation bilaterally.  Abdomen: Non-distended, soft  Extremities: No leg edema.    Assessment/Plan:     Diagnoses and all orders for this visit:    Hypertriglyceridemia  Chronic, possibly stable.  He continues on atorvastatin 20 mg daily.  We will recheck labs.  He has not been eating as healthy as before, plans to improve this soon.  Continues to be vegan.  He has not been exercising as much lately as well.  He plans to start working out regularly.  -     Comp Metabolic Panel; Future  -     Lipid Profile; Future    History of tobacco abuse (Quit >6 mos ago)  Former smoker, is interested in lung cancer screening.  Referral placed  -     REFERRAL TO LUNG CANCER SCREENING PROGRAM; Future    Need for hepatitis C screening test  -     HEP C VIRUS ANTIBODY; Future    Encounter for blood typing  -     ABO AND RH DETERMINATION; Future      Return in about 1 year (around 5/3/2024) for routine follow up.      "

## 2023-05-11 ENCOUNTER — HOSPITAL ENCOUNTER (OUTPATIENT)
Dept: LAB | Facility: MEDICAL CENTER | Age: 53
End: 2023-05-11
Attending: FAMILY MEDICINE
Payer: COMMERCIAL

## 2023-05-11 DIAGNOSIS — Z01.83 ENCOUNTER FOR BLOOD TYPING: ICD-10-CM

## 2023-05-11 DIAGNOSIS — Z11.59 NEED FOR HEPATITIS C SCREENING TEST: ICD-10-CM

## 2023-05-11 DIAGNOSIS — E78.1 HYPERTRIGLYCERIDEMIA: ICD-10-CM

## 2023-05-11 LAB
ABO GROUP BLD: NORMAL
ALBUMIN SERPL BCP-MCNC: 4.5 G/DL (ref 3.2–4.9)
ALBUMIN/GLOB SERPL: 1.8 G/DL
ALP SERPL-CCNC: 78 U/L (ref 30–99)
ALT SERPL-CCNC: 37 U/L (ref 2–50)
ANION GAP SERPL CALC-SCNC: 9 MMOL/L (ref 7–16)
AST SERPL-CCNC: 25 U/L (ref 12–45)
BILIRUB SERPL-MCNC: 0.6 MG/DL (ref 0.1–1.5)
BUN SERPL-MCNC: 17 MG/DL (ref 8–22)
CALCIUM ALBUM COR SERPL-MCNC: 8.7 MG/DL (ref 8.5–10.5)
CALCIUM SERPL-MCNC: 9.1 MG/DL (ref 8.5–10.5)
CHLORIDE SERPL-SCNC: 106 MMOL/L (ref 96–112)
CHOLEST SERPL-MCNC: 121 MG/DL (ref 100–199)
CO2 SERPL-SCNC: 28 MMOL/L (ref 20–33)
CREAT SERPL-MCNC: 0.77 MG/DL (ref 0.5–1.4)
GFR SERPLBLD CREATININE-BSD FMLA CKD-EPI: 108 ML/MIN/1.73 M 2
GLOBULIN SER CALC-MCNC: 2.5 G/DL (ref 1.9–3.5)
GLUCOSE SERPL-MCNC: 92 MG/DL (ref 65–99)
HCV AB SER QL: NORMAL
HDLC SERPL-MCNC: 35 MG/DL
LDLC SERPL CALC-MCNC: 24 MG/DL
POTASSIUM SERPL-SCNC: 4.4 MMOL/L (ref 3.6–5.5)
PROT SERPL-MCNC: 7 G/DL (ref 6–8.2)
RH BLD: NORMAL
SODIUM SERPL-SCNC: 143 MMOL/L (ref 135–145)
TRIGL SERPL-MCNC: 311 MG/DL (ref 0–149)

## 2023-05-11 PROCEDURE — 80061 LIPID PANEL: CPT

## 2023-05-11 PROCEDURE — 86900 BLOOD TYPING SEROLOGIC ABO: CPT

## 2023-05-11 PROCEDURE — 86803 HEPATITIS C AB TEST: CPT

## 2023-05-11 PROCEDURE — 86901 BLOOD TYPING SEROLOGIC RH(D): CPT

## 2023-05-11 PROCEDURE — 80053 COMPREHEN METABOLIC PANEL: CPT

## 2023-05-11 PROCEDURE — 36415 COLL VENOUS BLD VENIPUNCTURE: CPT

## 2023-06-19 PROCEDURE — RXMED WILLOW AMBULATORY MEDICATION CHARGE: Performed by: FAMILY MEDICINE

## 2023-06-19 RX ORDER — ATORVASTATIN CALCIUM 20 MG/1
20 TABLET, FILM COATED ORAL NIGHTLY
Qty: 90 TABLET | Refills: 3 | Status: SHIPPED | OUTPATIENT
Start: 2023-06-19

## 2023-06-23 ENCOUNTER — PHARMACY VISIT (OUTPATIENT)
Dept: PHARMACY | Facility: MEDICAL CENTER | Age: 53
End: 2023-06-23
Payer: COMMERCIAL

## 2023-09-18 PROCEDURE — RXMED WILLOW AMBULATORY MEDICATION CHARGE: Performed by: FAMILY MEDICINE

## 2023-09-26 ENCOUNTER — PHARMACY VISIT (OUTPATIENT)
Dept: PHARMACY | Facility: MEDICAL CENTER | Age: 53
End: 2023-09-26
Payer: COMMERCIAL

## 2023-10-18 ENCOUNTER — DOCUMENTATION (OUTPATIENT)
Dept: HEALTH INFORMATION MANAGEMENT | Facility: OTHER | Age: 53
End: 2023-10-18
Payer: COMMERCIAL

## 2023-11-28 ENCOUNTER — PHARMACY VISIT (OUTPATIENT)
Dept: PHARMACY | Facility: MEDICAL CENTER | Age: 53
End: 2023-11-28
Payer: COMMERCIAL

## 2023-11-28 PROCEDURE — RXMED WILLOW AMBULATORY MEDICATION CHARGE: Performed by: INTERNAL MEDICINE

## 2023-11-28 RX ORDER — COVID-19 VACCINE, MRNA 0.04 MG/.418ML
INJECTION, SUSPENSION INTRAMUSCULAR
Qty: 0.3 ML | Refills: 0 | OUTPATIENT
Start: 2023-11-28

## 2023-11-28 RX ORDER — INFLUENZA A VIRUS A/BRISBANE/02/2018 IVR-190 (H1N1) ANTIGEN (FORMALDEHYDE INACTIVATED), INFLUENZA A VIRUS A/KANSAS/14/2017 X-327 (H3N2) ANTIGEN (FORMALDEHYDE INACTIVATED), INFLUENZA B VIRUS B/PHUKET/3073/2013 ANTIGEN (FORMALDEHYDE INACTIVATED), AND INFLUENZA B VIRUS B/MARYLAND/15/2016 BX-69A ANTIGEN (FORMALDEHYDE INACTIVATED) 15; 15; 15; 15 UG/.5ML; UG/.5ML; UG/.5ML; UG/.5ML
INJECTION, SUSPENSION INTRAMUSCULAR
Qty: 0.5 ML | Refills: 0 | OUTPATIENT
Start: 2023-11-28

## 2023-12-18 PROCEDURE — RXMED WILLOW AMBULATORY MEDICATION CHARGE: Performed by: FAMILY MEDICINE

## 2023-12-22 ENCOUNTER — PHARMACY VISIT (OUTPATIENT)
Dept: PHARMACY | Facility: MEDICAL CENTER | Age: 53
End: 2023-12-22
Payer: COMMERCIAL

## 2024-02-23 ENCOUNTER — TELEPHONE (OUTPATIENT)
Dept: HEALTH INFORMATION MANAGEMENT | Facility: OTHER | Age: 54
End: 2024-02-23
Payer: COMMERCIAL

## 2024-02-24 NOTE — TELEPHONE ENCOUNTER
Pt requested to be added to [address], because he prefers a female PCP.     Pt wanted to know if he will be able to refill his Atorvastatin./ I called [facility] and refill is active, but is too soon to renew it. Pt was informed.

## 2024-03-26 PROCEDURE — RXMED WILLOW AMBULATORY MEDICATION CHARGE: Performed by: FAMILY MEDICINE

## 2024-04-01 ENCOUNTER — PHARMACY VISIT (OUTPATIENT)
Dept: PHARMACY | Facility: MEDICAL CENTER | Age: 54
End: 2024-04-01
Payer: COMMERCIAL

## 2024-07-10 ENCOUNTER — OFFICE VISIT (OUTPATIENT)
Dept: URGENT CARE | Facility: CLINIC | Age: 54
End: 2024-07-10
Payer: COMMERCIAL

## 2024-07-10 ENCOUNTER — PHARMACY VISIT (OUTPATIENT)
Dept: PHARMACY | Facility: MEDICAL CENTER | Age: 54
End: 2024-07-10
Payer: COMMERCIAL

## 2024-07-10 VITALS
HEART RATE: 69 BPM | OXYGEN SATURATION: 97 % | RESPIRATION RATE: 16 BRPM | SYSTOLIC BLOOD PRESSURE: 116 MMHG | DIASTOLIC BLOOD PRESSURE: 68 MMHG | HEIGHT: 67 IN | WEIGHT: 164 LBS | TEMPERATURE: 97 F | BODY MASS INDEX: 25.74 KG/M2

## 2024-07-10 DIAGNOSIS — E78.1 HYPERTRIGLYCERIDEMIA: ICD-10-CM

## 2024-07-10 PROCEDURE — 3078F DIAST BP <80 MM HG: CPT | Performed by: PHYSICIAN ASSISTANT

## 2024-07-10 PROCEDURE — RXMED WILLOW AMBULATORY MEDICATION CHARGE: Performed by: PHYSICIAN ASSISTANT

## 2024-07-10 PROCEDURE — 3074F SYST BP LT 130 MM HG: CPT | Performed by: PHYSICIAN ASSISTANT

## 2024-07-10 PROCEDURE — 99214 OFFICE O/P EST MOD 30 MIN: CPT | Performed by: PHYSICIAN ASSISTANT

## 2024-07-10 RX ORDER — ATORVASTATIN CALCIUM 20 MG/1
20 TABLET, FILM COATED ORAL NIGHTLY
Qty: 90 TABLET | Refills: 1 | Status: SHIPPED | OUTPATIENT
Start: 2024-07-10 | End: 2024-07-10 | Stop reason: SDUPTHER

## 2024-07-10 RX ORDER — ATORVASTATIN CALCIUM 20 MG/1
20 TABLET, FILM COATED ORAL NIGHTLY
Qty: 90 TABLET | Refills: 1 | Status: SHIPPED | OUTPATIENT
Start: 2024-07-10

## 2024-07-10 ASSESSMENT — ENCOUNTER SYMPTOMS
RESPIRATORY NEGATIVE: 1
CARDIOVASCULAR NEGATIVE: 1
GASTROINTESTINAL NEGATIVE: 1
CONSTITUTIONAL NEGATIVE: 1

## 2024-09-06 ENCOUNTER — APPOINTMENT (OUTPATIENT)
Dept: MEDICAL GROUP | Facility: MEDICAL CENTER | Age: 54
End: 2024-09-06
Payer: COMMERCIAL

## 2024-09-06 VITALS
DIASTOLIC BLOOD PRESSURE: 58 MMHG | HEART RATE: 71 BPM | BODY MASS INDEX: 27.4 KG/M2 | WEIGHT: 174.6 LBS | TEMPERATURE: 99.4 F | HEIGHT: 67 IN | OXYGEN SATURATION: 92 % | SYSTOLIC BLOOD PRESSURE: 92 MMHG

## 2024-09-06 DIAGNOSIS — Z13.29 SCREENING FOR ENDOCRINE, METABOLIC AND IMMUNITY DISORDER: ICD-10-CM

## 2024-09-06 DIAGNOSIS — Z11.4 ENCOUNTER FOR SCREENING FOR HIV: ICD-10-CM

## 2024-09-06 DIAGNOSIS — M67.40 GANGLION CYST: ICD-10-CM

## 2024-09-06 DIAGNOSIS — Z78.9 VEGAN DIET: ICD-10-CM

## 2024-09-06 DIAGNOSIS — Z76.89 ENCOUNTER TO ESTABLISH CARE: ICD-10-CM

## 2024-09-06 DIAGNOSIS — Z13.228 SCREENING FOR ENDOCRINE, METABOLIC AND IMMUNITY DISORDER: ICD-10-CM

## 2024-09-06 DIAGNOSIS — Z13.21 ENCOUNTER FOR VITAMIN DEFICIENCY SCREENING: ICD-10-CM

## 2024-09-06 DIAGNOSIS — E78.1 HYPERTRIGLYCERIDEMIA: ICD-10-CM

## 2024-09-06 DIAGNOSIS — Z87.891 FORMER CIGARETTE SMOKER: ICD-10-CM

## 2024-09-06 DIAGNOSIS — Z13.0 SCREENING FOR ENDOCRINE, METABOLIC AND IMMUNITY DISORDER: ICD-10-CM

## 2024-09-06 PROCEDURE — 3074F SYST BP LT 130 MM HG: CPT

## 2024-09-06 PROCEDURE — 99214 OFFICE O/P EST MOD 30 MIN: CPT

## 2024-09-06 PROCEDURE — 3078F DIAST BP <80 MM HG: CPT

## 2024-09-06 RX ORDER — ATORVASTATIN CALCIUM 20 MG/1
20 TABLET, FILM COATED ORAL NIGHTLY
Qty: 90 TABLET | Refills: 3 | Status: SHIPPED | OUTPATIENT
Start: 2024-09-06

## 2024-09-06 ASSESSMENT — ENCOUNTER SYMPTOMS
PALPITATIONS: 0
SHORTNESS OF BREATH: 0
CHILLS: 0
FEVER: 0
ORTHOPNEA: 0
COUGH: 0

## 2024-09-06 ASSESSMENT — PATIENT HEALTH QUESTIONNAIRE - PHQ9: CLINICAL INTERPRETATION OF PHQ2 SCORE: 0

## 2024-09-06 NOTE — PROGRESS NOTES
"Subjective:     Verbal consent was acquired by the patient to use [employer] ambient listening note generation during this visit Yes    CC: Establish Care (Pt would like labs ordered)      HPI:   ROSALIE is a 54 y.o. male who presents today for:    History of Present Illness  The patient presents to establish care.    He is seeking lab work and medication refills. He has a history of elevated triglycerides and underwent hip surgery in 2012 when he broke his hip. At that time, he quit smoking after a 25-year habit. He denies any new cough, weight loss or coughing up blood.     He is currently on atorvastatin, taken nightly before bed, and reports no side effects. He experienced side effects with a previous cholesterol medication. He follows a vegan diet. He is also requesting a check on his vitamin B levels due to his vegan diet.    He also reports a recurring ganglion cyst in his left hand, which has moved from its original location and caused more pain during its second occurrence. He has found that pinching the cysts can provide temporary relief. He has already consulted with orthopedics about this issue, and plans to have it removed at some point.           Allergies: Bee venom and Wine [alcohol]     Medications:   Current Outpatient Medications:     atorvastatin (LIPITOR) 20 MG Tab, Take 1 Tablet by mouth every evening., Disp: 90 Tablet, Rfl: 3      ROS:  Review of Systems   Constitutional:  Negative for chills and fever.   Respiratory:  Negative for cough and shortness of breath.    Cardiovascular:  Negative for chest pain, palpitations, orthopnea and leg swelling.       Objective:     Exam:  BP 92/58   Pulse 71   Temp 37.4 °C (99.4 °F) (Temporal)   Ht 1.702 m (5' 7\")   Wt 79.2 kg (174 lb 9.6 oz)   SpO2 92%   BMI 27.35 kg/m²  Body mass index is 27.35 kg/m².    Physical Exam  Constitutional:       Appearance: He is normal weight.   Eyes:      Pupils: Pupils are equal, round, and reactive to light. "   Cardiovascular:      Rate and Rhythm: Normal rate and regular rhythm.      Pulses: Normal pulses.      Heart sounds: Normal heart sounds.   Pulmonary:      Effort: Pulmonary effort is normal.      Breath sounds: Normal breath sounds.   Neurological:      Mental Status: He is alert and oriented to person, place, and time.   Psychiatric:         Mood and Affect: Mood normal.         Behavior: Behavior normal.           Assessment & Plan:     ROSALIE carrillo 54 y.o. male with the following -       1. Encounter to establish care    2. Hypertriglyceridemia  Chronic, stable  He is currently taking atorvastatin (Lipitor) before bed each night without any side effects. A prescription for atorvastatin with a 90-day supply and three refills will be provided.  Continue to work on diet and exercise to help with triglyceride management.  - atorvastatin (LIPITOR) 20 MG Tab; Take 1 Tablet by mouth every evening.  Dispense: 90 Tablet; Refill: 3  - Lipid Profile; Future    3. Ganglion cyst  Chronic, stable  He reports that the ganglion cyst in his left hand has recurred but has temporarily resolved. He is aware that it may return unless the entire sac is surgically removed. He has established with orthopedics at [facility], when he is ready to have it removed.    4. Former cigarette smoker  Chronic, stable  - REFERRAL TO LUNG CANCER SCREENING PROGRAM    5. Vegan diet  6. Encounter for vitamin deficiency screening  Chronic, stable  - VITAMIN B12; Future  - VITAMIN B1; Future  - VITAMIN D,25 HYDROXY (DEFICIENCY); Future    7. Encounter for screening for HIV  - HIV AG/AB COMBO ASSAY SCREENING; Future    8. Screening for endocrine, metabolic and immunity disorder  - TSH WITH REFLEX TO FT4; Future  - Comp Metabolic Panel; Future  - CBC WITHOUT DIFFERENTIAL; Future        Anticipatory guidance included the following: Patient counseled about skin care, diet, supplements, smoking, drugs/alcohol use, safe sex and exercise.     Return in about 1 year  (around 9/6/2025), or if symptoms worsen or fail to improve.    Please note that this dictation was created using voice recognition software. I have made every reasonable attempt to correct obvious errors, but I expect that there are errors of grammar and possibly content that I did not discover before finalizing the note.

## 2024-09-13 ENCOUNTER — TELEPHONE (OUTPATIENT)
Dept: HEALTH INFORMATION MANAGEMENT | Facility: OTHER | Age: 54
End: 2024-09-13

## 2024-09-13 NOTE — TELEPHONE ENCOUNTER
Called to verify program eligibility/LVM with Direct line for call back/Warm Transfer to Patient Outreach [phone]

## 2024-09-20 NOTE — TELEPHONE ENCOUNTER
Outcome: Left Message    Please transfer to Patient Outreach Team at [phone] when patient returns call.      Attempt # 2

## 2024-09-27 ENCOUNTER — DOCUMENTATION (OUTPATIENT)
Dept: HEALTH INFORMATION MANAGEMENT | Facility: OTHER | Age: 54
End: 2024-09-27
Payer: COMMERCIAL

## 2024-10-11 DIAGNOSIS — E78.1 HYPERTRIGLYCERIDEMIA: ICD-10-CM

## 2024-10-11 RX ORDER — ATORVASTATIN CALCIUM 20 MG/1
20 TABLET, FILM COATED ORAL NIGHTLY
Qty: 90 TABLET | Refills: 3 | Status: SHIPPED | OUTPATIENT
Start: 2024-10-11 | End: 2024-10-15 | Stop reason: SDUPTHER

## 2024-10-15 DIAGNOSIS — E78.1 HYPERTRIGLYCERIDEMIA: ICD-10-CM

## 2024-10-15 RX ORDER — ATORVASTATIN CALCIUM 20 MG/1
20 TABLET, FILM COATED ORAL NIGHTLY
Qty: 90 TABLET | Refills: 3 | Status: SHIPPED | OUTPATIENT
Start: 2024-10-15